# Patient Record
Sex: FEMALE | Race: BLACK OR AFRICAN AMERICAN | NOT HISPANIC OR LATINO | Employment: FULL TIME | URBAN - METROPOLITAN AREA
[De-identification: names, ages, dates, MRNs, and addresses within clinical notes are randomized per-mention and may not be internally consistent; named-entity substitution may affect disease eponyms.]

---

## 2020-03-12 LAB — CRC RECOMMENDATION EXT: NORMAL

## 2024-07-03 ENCOUNTER — PATIENT MESSAGE (OUTPATIENT)
Dept: ADMINISTRATIVE | Facility: OTHER | Age: 54
End: 2024-07-03
Payer: COMMERCIAL

## 2024-07-03 PROBLEM — E66.9 OBESITY: Status: ACTIVE | Noted: 2024-07-03

## 2024-07-03 PROBLEM — E78.49 OTHER HYPERLIPIDEMIA: Status: ACTIVE | Noted: 2024-07-03

## 2024-07-03 PROBLEM — I10 PRIMARY HYPERTENSION: Status: ACTIVE | Noted: 2024-07-03

## 2024-07-08 ENCOUNTER — PATIENT MESSAGE (OUTPATIENT)
Dept: ADMINISTRATIVE | Facility: OTHER | Age: 54
End: 2024-07-08
Payer: COMMERCIAL

## 2024-07-26 ENCOUNTER — PATIENT MESSAGE (OUTPATIENT)
Dept: ADMINISTRATIVE | Facility: OTHER | Age: 54
End: 2024-07-26
Payer: COMMERCIAL

## 2024-07-26 ENCOUNTER — PATIENT MESSAGE (OUTPATIENT)
Dept: OTHER | Facility: OTHER | Age: 54
End: 2024-07-26
Payer: COMMERCIAL

## 2024-08-01 ENCOUNTER — TELEPHONE (OUTPATIENT)
Dept: FAMILY MEDICINE | Facility: CLINIC | Age: 54
End: 2024-08-01
Payer: COMMERCIAL

## 2024-08-01 NOTE — TELEPHONE ENCOUNTER
Spoke with pt and confirmed new pt appt. Informed pt to bring all current medications to appt and if pt no shows we will be unable to reschedule them. Pt voiced understanding.

## 2024-08-06 ENCOUNTER — OFFICE VISIT (OUTPATIENT)
Dept: FAMILY MEDICINE | Facility: CLINIC | Age: 54
End: 2024-08-06
Payer: COMMERCIAL

## 2024-08-06 VITALS
HEART RATE: 84 BPM | DIASTOLIC BLOOD PRESSURE: 62 MMHG | BODY MASS INDEX: 41.28 KG/M2 | HEIGHT: 61 IN | OXYGEN SATURATION: 97 % | SYSTOLIC BLOOD PRESSURE: 128 MMHG | WEIGHT: 218.63 LBS

## 2024-08-06 DIAGNOSIS — Z12.31 OTHER SCREENING MAMMOGRAM: ICD-10-CM

## 2024-08-06 DIAGNOSIS — I10 PRIMARY HYPERTENSION: ICD-10-CM

## 2024-08-06 DIAGNOSIS — R25.2 LEG CRAMPING: ICD-10-CM

## 2024-08-06 DIAGNOSIS — E78.49 OTHER HYPERLIPIDEMIA: ICD-10-CM

## 2024-08-06 DIAGNOSIS — Z76.89 ENCOUNTER TO ESTABLISH CARE: Primary | ICD-10-CM

## 2024-08-06 DIAGNOSIS — G47.00 INSOMNIA, UNSPECIFIED TYPE: ICD-10-CM

## 2024-08-06 DIAGNOSIS — E66.01 CLASS 3 SEVERE OBESITY DUE TO EXCESS CALORIES WITH BODY MASS INDEX (BMI) OF 40.0 TO 44.9 IN ADULT, UNSPECIFIED WHETHER SERIOUS COMORBIDITY PRESENT: ICD-10-CM

## 2024-08-06 DIAGNOSIS — N95.1 VASOMOTOR SYMPTOMS DUE TO MENOPAUSE: ICD-10-CM

## 2024-08-06 DIAGNOSIS — B00.9 HSV INFECTION: ICD-10-CM

## 2024-08-06 DIAGNOSIS — R73.03 PREDIABETES: ICD-10-CM

## 2024-08-06 PROCEDURE — 99204 OFFICE O/P NEW MOD 45 MIN: CPT | Mod: ,,,

## 2024-08-06 PROCEDURE — 3008F BODY MASS INDEX DOCD: CPT | Mod: CPTII,,,

## 2024-08-06 PROCEDURE — 4010F ACE/ARB THERAPY RXD/TAKEN: CPT | Mod: CPTII,,,

## 2024-08-06 PROCEDURE — 3078F DIAST BP <80 MM HG: CPT | Mod: CPTII,,,

## 2024-08-06 PROCEDURE — 3074F SYST BP LT 130 MM HG: CPT | Mod: CPTII,,,

## 2024-08-06 PROCEDURE — 1159F MED LIST DOCD IN RCRD: CPT | Mod: CPTII,,,

## 2024-08-06 PROCEDURE — 1160F RVW MEDS BY RX/DR IN RCRD: CPT | Mod: CPTII,,,

## 2024-08-06 RX ORDER — VALACYCLOVIR HYDROCHLORIDE 500 MG/1
500 TABLET, FILM COATED ORAL 2 TIMES DAILY
COMMUNITY
End: 2024-08-06 | Stop reason: SDUPTHER

## 2024-08-06 RX ORDER — POTASSIUM CHLORIDE 750 MG/1
10 TABLET, EXTENDED RELEASE ORAL DAILY
Qty: 90 TABLET | Refills: 1 | Status: SHIPPED | OUTPATIENT
Start: 2024-08-06 | End: 2025-02-02

## 2024-08-06 RX ORDER — PHENTERMINE HYDROCHLORIDE 8 MG/1
1 TABLET ORAL 2 TIMES DAILY
Qty: 90 EACH | Refills: 1 | Status: CANCELLED | OUTPATIENT
Start: 2024-08-06

## 2024-08-06 RX ORDER — ATORVASTATIN CALCIUM 10 MG/1
10 TABLET, FILM COATED ORAL NIGHTLY
Qty: 90 TABLET | Refills: 1 | Status: SHIPPED | OUTPATIENT
Start: 2024-08-06

## 2024-08-06 RX ORDER — ZOLPIDEM TARTRATE 10 MG/1
10 TABLET ORAL NIGHTLY
Qty: 30 TABLET | Refills: 2 | Status: SHIPPED | OUTPATIENT
Start: 2024-08-06

## 2024-08-06 RX ORDER — POTASSIUM CHLORIDE 750 MG/1
10 TABLET, EXTENDED RELEASE ORAL ONCE
COMMUNITY
End: 2024-08-06 | Stop reason: SDUPTHER

## 2024-08-06 RX ORDER — CYCLOBENZAPRINE HCL 10 MG
10 TABLET ORAL 3 TIMES DAILY PRN
Qty: 60 TABLET | Refills: 1 | Status: SHIPPED | OUTPATIENT
Start: 2024-08-06

## 2024-08-06 RX ORDER — VALACYCLOVIR HYDROCHLORIDE 500 MG/1
500 TABLET, FILM COATED ORAL 2 TIMES DAILY
Qty: 30 TABLET | Refills: 2
Start: 2024-08-06

## 2024-08-06 RX ORDER — ESTRADIOL 0.5 MG/1
0.5 TABLET ORAL DAILY
Qty: 90 TABLET | Refills: 1 | Status: CANCELLED | OUTPATIENT
Start: 2024-08-06

## 2024-08-06 RX ORDER — HYDROCHLOROTHIAZIDE 12.5 MG/1
12.5 TABLET ORAL DAILY
Qty: 90 TABLET | Refills: 1 | Status: SHIPPED | OUTPATIENT
Start: 2024-08-06 | End: 2025-02-02

## 2024-08-06 RX ORDER — LOSARTAN POTASSIUM 25 MG/1
25 TABLET ORAL DAILY
Qty: 90 TABLET | Refills: 1 | Status: SHIPPED | OUTPATIENT
Start: 2024-08-06 | End: 2025-02-02

## 2024-08-07 ENCOUNTER — CLINICAL SUPPORT (OUTPATIENT)
Dept: OTHER | Facility: CLINIC | Age: 54
End: 2024-08-07

## 2024-08-07 DIAGNOSIS — Z00.8 ENCOUNTER FOR OTHER GENERAL EXAMINATION: ICD-10-CM

## 2024-08-08 VITALS
DIASTOLIC BLOOD PRESSURE: 72 MMHG | SYSTOLIC BLOOD PRESSURE: 116 MMHG | BODY MASS INDEX: 40.78 KG/M2 | HEIGHT: 61 IN | WEIGHT: 216 LBS

## 2024-08-08 LAB
GLUCOSE SERPL-MCNC: 83 MG/DL (ref 60–140)
HDLC SERPL-MCNC: 50 MG/DL
POC CHOLESTEROL, LDL (DOCK): 130 MG/DL
POC CHOLESTEROL, TOTAL: 189 MG/DL
TRIGL SERPL-MCNC: 46 MG/DL

## 2024-08-19 ENCOUNTER — TELEPHONE (OUTPATIENT)
Dept: FAMILY MEDICINE | Facility: CLINIC | Age: 54
End: 2024-08-19

## 2024-08-19 NOTE — PROGRESS NOTES
Biometrics completed.    Results reviewed with a Registered Nurse; understanding of results and   educational materials was verbalized.   Medical Records sent

## 2024-08-19 NOTE — TELEPHONE ENCOUNTER
Pt walked in about PA on Fezolinetant. Pt wanted to know update. Looks like Charity AKINS not sure why. Please call patient once there is an answer

## 2024-08-23 DIAGNOSIS — B00.9 HSV INFECTION: ICD-10-CM

## 2024-08-23 DIAGNOSIS — I10 PRIMARY HYPERTENSION: ICD-10-CM

## 2024-08-23 DIAGNOSIS — N95.1 VASOMOTOR SYMPTOMS DUE TO MENOPAUSE: ICD-10-CM

## 2024-08-23 DIAGNOSIS — E78.49 OTHER HYPERLIPIDEMIA: ICD-10-CM

## 2024-08-23 DIAGNOSIS — R25.2 LEG CRAMPING: ICD-10-CM

## 2024-08-23 DIAGNOSIS — G47.00 INSOMNIA, UNSPECIFIED TYPE: ICD-10-CM

## 2024-08-23 RX ORDER — LOSARTAN POTASSIUM 25 MG/1
25 TABLET ORAL DAILY
Qty: 90 TABLET | Refills: 1 | Status: SHIPPED | OUTPATIENT
Start: 2024-08-23 | End: 2025-02-19

## 2024-08-23 RX ORDER — CYCLOBENZAPRINE HCL 10 MG
10 TABLET ORAL 3 TIMES DAILY PRN
Qty: 60 TABLET | Refills: 1 | Status: SHIPPED | OUTPATIENT
Start: 2024-08-23

## 2024-08-23 RX ORDER — VALACYCLOVIR HYDROCHLORIDE 500 MG/1
500 TABLET, FILM COATED ORAL 2 TIMES DAILY
Qty: 180 TABLET | Refills: 1 | Status: SHIPPED | OUTPATIENT
Start: 2024-08-23 | End: 2025-02-19

## 2024-08-23 RX ORDER — POTASSIUM CHLORIDE 750 MG/1
10 TABLET, EXTENDED RELEASE ORAL DAILY
Qty: 90 TABLET | Refills: 1 | Status: SHIPPED | OUTPATIENT
Start: 2024-08-23 | End: 2025-02-19

## 2024-08-23 RX ORDER — ZOLPIDEM TARTRATE 10 MG/1
10 TABLET ORAL NIGHTLY
Qty: 30 TABLET | Refills: 2 | Status: SHIPPED | OUTPATIENT
Start: 2024-08-23

## 2024-08-23 RX ORDER — ATORVASTATIN CALCIUM 10 MG/1
10 TABLET, FILM COATED ORAL NIGHTLY
Qty: 90 TABLET | Refills: 1 | Status: SHIPPED | OUTPATIENT
Start: 2024-08-23

## 2024-08-23 RX ORDER — HYDROCHLOROTHIAZIDE 12.5 MG/1
12.5 TABLET ORAL DAILY
Qty: 90 TABLET | Refills: 1 | Status: SHIPPED | OUTPATIENT
Start: 2024-08-23 | End: 2025-02-19

## 2024-08-23 NOTE — TELEPHONE ENCOUNTER
----- Message from Adrian Cardenas sent at 8/23/2024 10:15 AM CDT -----  Pt came in and said change her pharmacy to [facility] pharmacy. She wants all of her medicines to go there.  [phone]

## 2024-09-23 ENCOUNTER — PATIENT MESSAGE (OUTPATIENT)
Dept: FAMILY MEDICINE | Facility: CLINIC | Age: 54
End: 2024-09-23
Payer: COMMERCIAL

## 2024-09-23 ENCOUNTER — TELEPHONE (OUTPATIENT)
Dept: FAMILY MEDICINE | Facility: CLINIC | Age: 54
End: 2024-09-23
Payer: COMMERCIAL

## 2024-09-23 NOTE — TELEPHONE ENCOUNTER
Spoke with pt advised we can not result hormone labs.   Pt is wanting us to review A1C  See portal message.

## 2024-09-23 NOTE — TELEPHONE ENCOUNTER
----- Message from Deisy Gomez sent at 9/23/2024 10:01 AM CDT -----  Pt dropped off gyn labs and pap. Would like merritt to review as she has not heard back from them yet   [phone]

## 2024-09-23 NOTE — TELEPHONE ENCOUNTER
Spoke with pt she is aware we are not resulting hormone labs.  Pt states she would like for glucose and A1c to be reviewed.   Pt states she has been eating mediterranean diet and losing weight.   Pt is on the weight list with weight loss clinic with insurance.     On your desk

## 2024-09-25 ENCOUNTER — TELEPHONE (OUTPATIENT)
Dept: FAMILY MEDICINE | Facility: CLINIC | Age: 54
End: 2024-09-25
Payer: COMMERCIAL

## 2024-09-25 NOTE — TELEPHONE ENCOUNTER
Pt states her B12, TSH, FSH were elevated etc. Advised pt she needs to wait  for OBGYN reccommendations since a hematologist wouldn't even be appropriate. She voiced understanding.

## 2024-09-25 NOTE — TELEPHONE ENCOUNTER
----- Message from Adrian Cardenas sent at 9/25/2024 11:38 AM CDT -----  Pt said she sent a message about her labs that her ob/gyn ordered and wants to know if she could get a referral to see a hematologist because she is concerned.  [phone]

## 2024-10-03 ENCOUNTER — HOSPITAL ENCOUNTER (OUTPATIENT)
Dept: RADIOLOGY | Facility: HOSPITAL | Age: 54
Discharge: HOME | End: 2024-10-03
Payer: COMMERCIAL

## 2024-10-03 VITALS — HEIGHT: 61 IN | WEIGHT: 216 LBS | BODY MASS INDEX: 40.78 KG/M2

## 2024-10-03 DIAGNOSIS — Z12.31 OTHER SCREENING MAMMOGRAM: ICD-10-CM

## 2024-10-03 PROCEDURE — 77067 SCR MAMMO BI INCL CAD: CPT | Mod: TC,PO

## 2024-10-03 PROCEDURE — 77063 BREAST TOMOSYNTHESIS BI: CPT | Mod: 26,,, | Performed by: RADIOLOGY

## 2024-10-03 PROCEDURE — 77067 SCR MAMMO BI INCL CAD: CPT | Mod: 26,,, | Performed by: RADIOLOGY

## 2024-10-04 ENCOUNTER — TELEPHONE (OUTPATIENT)
Dept: FAMILY MEDICINE | Facility: CLINIC | Age: 54
End: 2024-10-04
Payer: COMMERCIAL

## 2024-10-04 ENCOUNTER — TELEPHONE (OUTPATIENT)
Facility: CLINIC | Age: 54
End: 2024-10-04
Payer: COMMERCIAL

## 2024-10-04 DIAGNOSIS — R79.89 ELEVATED FERRITIN LEVEL: ICD-10-CM

## 2024-10-04 DIAGNOSIS — R79.89 ELEVATED VITAMIN B12 LEVEL: Primary | ICD-10-CM

## 2024-10-04 NOTE — TELEPHONE ENCOUNTER
Spoke with patient informed of mammo results per Rebeca GAN Patient verbalized understanding.     Remind me created.

## 2024-10-04 NOTE — NURSING
Oncology Navigation   Intake  Cancer Type: Benign hem  Type of Referral: Internal  Date of Referral: 10/04/24  Initial Nurse Navigator Contact: 10/04/24  Referral to Initial Contact Timeline (days): 0     Treatment                              Acuity      Follow Up  No follow-ups on file.

## 2024-10-04 NOTE — TELEPHONE ENCOUNTER
----- Message from DARWIN Bales sent at 10/4/2024  9:45 AM CDT -----  Normal mammogram. Repeat annually.

## 2024-10-17 NOTE — PROGRESS NOTES
Patient ID: Fredo Chacon is a 53 y.o. Black or  female    Subjective  Chief Complaint: patient presents for medical weight loss management.    Contraindications to GLP-1 receptor agonist therapy:   Denies personal or family history of MTC and personal history of MEN2     Co-morbidities: HTN, DLD, prediabetes    History of weight loss therapy:  Pt has previously tried phentermine but denies use of GLP-1 medications.     Weight loss history:  Starting weight:    10/15/2024   Recent Readings    Weight (lbs) 213.8 lb    BMI 39.1 BMI      Objective  No results found.    Assessment/Plan  -Pt qualifies for GLP-1 RA therapy based on BMI greater than or equal to 30 kg/m2  -Initiate Wegovy 0.25 mg once weekly for 1 month  -Then increase to Wegovy 0.5 mg once weekly for 1 month  -Then increase to Wegovy 1 mg once weekly  -RTC in 3 months    Patient consented to pharmacist management via collaborative practice.

## 2024-10-18 ENCOUNTER — OFFICE VISIT (OUTPATIENT)
Dept: INTERNAL MEDICINE | Facility: CLINIC | Age: 54
End: 2024-10-18
Payer: COMMERCIAL

## 2024-10-18 ENCOUNTER — PATIENT MESSAGE (OUTPATIENT)
Dept: INTERNAL MEDICINE | Facility: CLINIC | Age: 54
End: 2024-10-18

## 2024-10-18 DIAGNOSIS — E66.9 OBESITY, UNSPECIFIED CLASS, UNSPECIFIED OBESITY TYPE, UNSPECIFIED WHETHER SERIOUS COMORBIDITY PRESENT: Primary | ICD-10-CM

## 2024-10-18 PROCEDURE — 99499 UNLISTED E&M SERVICE: CPT | Mod: 95,,,

## 2024-10-19 RX ORDER — SEMAGLUTIDE 1 MG/.5ML
1 INJECTION, SOLUTION SUBCUTANEOUS
Qty: 2 ML | Refills: 0 | Status: ACTIVE | OUTPATIENT
Start: 2024-12-14

## 2024-10-19 RX ORDER — SEMAGLUTIDE 0.25 MG/.5ML
0.25 INJECTION, SOLUTION SUBCUTANEOUS
Qty: 2 ML | Refills: 0 | Status: ACTIVE | OUTPATIENT
Start: 2024-10-19

## 2024-10-19 RX ORDER — SEMAGLUTIDE 0.5 MG/.5ML
0.5 INJECTION, SOLUTION SUBCUTANEOUS
Qty: 2 ML | Refills: 0 | Status: ACTIVE | OUTPATIENT
Start: 2024-11-16

## 2024-10-24 PROBLEM — R79.89 ELEVATED FERRITIN: Status: ACTIVE | Noted: 2024-10-24

## 2024-10-24 PROBLEM — R79.89 ELEVATED VITAMIN B12 LEVEL: Status: ACTIVE | Noted: 2024-10-24

## 2024-10-24 NOTE — PROGRESS NOTES
[facility]  History & Physical    Subjective:      Patient ID:   NAME: Fredo Chacon : 1970     53 y.o. female    Referring Doc: Deisy Luong APRN*  Other Physicians: Beena Howard NP (GYN)        Chief Complaint: elevated ferritin/B12      HPI:  53 y.o. female with diagnosis of elevated ferritin/B12 levels who has been referred by Deisy Luong APRN* for evaluation by medical hematology/oncology. She is here by herself    She saw GYN about her menopause issues and had some basic labs done.     She has a lot of fatigue; breathing is fine.    She had HTN and is prediabetic. She is on metformin and has been on this for about a month. She is also on thyroid meds.    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     Maternal-Grandmother as RA and mom has COPD and OA    Paternal Aunt with leukemia    No tobacco or alcohol              ROS:   GEN: normal without any fever, night sweats or weight loss; fatigue  HEENT: normal with no HA's, sore throat, stiff neck, changes in vision  CV: normal with no CP, SOB, PND, STEVEN or orthopnea  PULM: normal with no SOB, cough, hemoptysis, sputum or pleuritic pain  GI: normal with no abdominal pain, nausea, vomiting, constipation, diarrhea, melanotic stools, BRBPR, or hematemesis  : normal with no hematuria, dysuria  BREAST: normal with no mass, discharge, pain  SKIN: normal with no rash, erythema, bruising, or swelling       Past Medical/Surgical History:  Past Medical History:   Diagnosis Date    Elevated ferritin 10/24/2024    Elevated vitamin B12 level 10/24/2024    Obesity     Other hyperlipidemia 2024     Past Surgical History:   Procedure Laterality Date    CHOLECYSTECTOMY      HYSTERECTOMY      TUBAL LIGATION           Allergies:  Review of patient's allergies indicates:  No Known Allergies    Social/Family History:  Social History     Socioeconomic History    Marital status: Single   Tobacco Use     Smoking status: Never    Smokeless tobacco: Never   Substance and Sexual Activity    Alcohol use: Never    Drug use: Never     Social Drivers of Health     Financial Resource Strain: Low Risk  (8/5/2024)    Overall Financial Resource Strain (CARDIA)     Difficulty of Paying Living Expenses: Not hard at all   Food Insecurity: No Food Insecurity (8/5/2024)    Hunger Vital Sign     Worried About Running Out of Food in the Last Year: Never true     Ran Out of Food in the Last Year: Never true   Physical Activity: Insufficiently Active (8/5/2024)    Exercise Vital Sign     Days of Exercise per Week: 2 days     Minutes of Exercise per Session: 30 min   Stress: Stress Concern Present (8/5/2024)    [employer] - Occupational Stress Questionnaire     Feeling of Stress : Rather much   Housing Stability: Unknown (8/5/2024)    Housing Stability Vital Sign     Unable to Pay for Housing in the Last Year: No     Family History   Problem Relation Name Age of Onset    Hypertension Mother      Hyperlipidemia Mother      Hypertension Brother      Hypertension Maternal Grandmother           Medications:    Current Outpatient Medications:     atorvastatin (LIPITOR) 10 MG tablet, Take 1 tablet (10 mg total) by mouth every evening., Disp: 90 tablet, Rfl: 1    cyclobenzaprine (FLEXERIL) 10 MG tablet, Take 1 tablet (10 mg total) by mouth 3 (three) times daily as needed for Muscle spasms., Disp: 60 tablet, Rfl: 1    estradioL (ESTRACE) 1 MG tablet, Take 1 oral tablet once per day, Disp: 30 tablet, Rfl: 3    hydroCHLOROthiazide (HYDRODIURIL) 12.5 MG Tab, Take 1 tablet (12.5 mg total) by mouth once daily., Disp: 90 tablet, Rfl: 1    losartan (COZAAR) 25 MG tablet, Take 1 tablet (25 mg total) by mouth once daily., Disp: 90 tablet, Rfl: 1    metFORMIN (GLUCOPHAGE) 500 MG tablet, Take 1 oral tablet twice per day, Disp: 60 tablet, Rfl: 3    metFORMIN (GLUCOPHAGE-XR) 500 MG ER 24hr tablet, Take 2 tablets (1,000 mg  total) by mouth 2 (two) times a day., Disp: 60 tablet, Rfl: 0    NP THYROID 30 mg Tab, Take 1 oral tablet once per day on empty stomach. Wait 30 minutes before consuming any food or beverage, Disp: 1 tablet, Rfl: 0    NP THYROID 30 mg Tab, Take 1 oral tablet once per day on empty stomach. Wait 30 minutes before consuming any food or beverage, Disp: 30 tablet, Rfl: 0    potassium chloride (KLOR-CON) 10 MEQ TbSR, Take 1 tablet (10 mEq total) by mouth once daily. for 180 doses, Disp: 90 tablet, Rfl: 1    semaglutide, weight loss, (WEGOVY) 0.25 mg/0.5 mL PnIj, Inject 0.25 mg into the skin every 7 days., Disp: 2 mL, Rfl: 0    [START ON 11/16/2024] semaglutide, weight loss, (WEGOVY) 0.5 mg/0.5 mL PnIj, Inject 0.5 mg into the skin every 7 days., Disp: 2 mL, Rfl: 0    [START ON 12/14/2024] semaglutide, weight loss, (WEGOVY) 1 mg/0.5 mL PnIj, Inject 1 mg into the skin every 7 days., Disp: 2 mL, Rfl: 0    valACYclovir (VALTREX) 500 MG tablet, Take 1 tablet (500 mg total) by mouth 2 (two) times daily., Disp: 180 tablet, Rfl: 1    zolpidem (AMBIEN) 10 mg Tab, Take 1 tablet (10 mg total) by mouth every evening., Disp: 30 tablet, Rfl: 2    fezolinetant 45 mg Tab, Take 1 TABLET (45 mg) by mouth every evening., Disp: 30 tablet, Rfl: 11      Pathology:   Cancer Staging   No matching staging information was found for the patient.        Objective:   Vitals:  Blood pressure 120/84, pulse 71, temperature 97.4 °F (36.3 °C), resp. rate 16, weight 98 kg (216 lb).    Physical Examination:   GEN: no apparent distress, comfortable; AAOx3; overweight  HEAD: atraumatic and normocephalic  EYES: no pallor, no icterus, PERRLA  ENT: OMM, no pharyngeal erythema, external ears WNL; no nasal discharge; no thrush  NECK: no masses, thyroid normal, trachea midline, no LAD/LN's, supple  CV: RRR with no murmur; normal pulse; normal S1 and S2; no pedal edema  CHEST: Normal respiratory effort; CTAB; normal breath sounds; no wheeze or crackles  ABDOM:  nontender and nondistended; soft; normal bowel sounds; no rebound/guarding  MUSC/Skeletal: ROM normal; no crepitus; joints normal; no deformities or arthropathy  EXTREM: no clubbing, cyanosis, inflammation or swelling  SKIN: no rashes, lesions, ulcers, petechiae or subcutaneous nodules  : no orr  NEURO: grossly intact; motor/sensory WNL; AAOx3; no tremors  PSYCH: normal mood, affect and behavior  LYMPH: normal cervical, supraclavicular, axillary and groin LN's      Labs:     9/16/2024 on chart from [facility]          Radiology/Diagnostic Studies:          All lab results and imaging results have been reviewed and discussed with the patient    Assessment:   (1) 53 y.o. female with diagnosis of elevated ferritin/B12 levels who has been referred by Deisy Luong APRN* for evaluation by medical hematology/oncology.     - ferritin was 201 and only mildly elevated - could be an acute phase reactant due to inflammation, arthritis, etc; will check complete iron panel and order hemochromatosis gene study out of abundance of precaution  - B12 level was 1353 and only mildly elevated - B12 is a water soluble vitamin that is obtained from the foods we eat; excess is urinated out  - CBC is adequate and WNL  - she had colonoscopy about 5 yrs ago and was good except for internal hemorrhoids     (2) HTN and hypercholesterolemia    (3) Prediabetic    (4) Obesity - on phentermine     (5) Hx/of HSV infection    (6) Insomnia     (7) Hx/of hysterectomy (2009)    (8) Hx/of cholecystectomy ('85)          VISIT DIAGNOSES:     Elevated ferritin    Elevated vitamin B12 level  -     Ambulatory referral/consult to Hematology / Oncology    Elevated ferritin level  -     Ambulatory referral/consult to Hematology / Oncology          Plan:     PLAN:  Check full iron panel and CBC and monitor every 3 months for now  Order hemochromatosis study out of abundance of precaution  F/u with GYN and PCP  Recommend she f/u with GI as directed for  routine colonoscopy surveillance etc     RTC in  4 weeks   Fax note to Deisy Luong APRN*; Beena Howard NP (GYN)        I have explained and the patient understands all of  the current recommendation(s). I have answered all of their questions to the best of my ability and to their complete satisfaction.           Thank you for allowing me to participate in this patient's care. Please call with any questions or concerns.    Electronically signed Eliazar Wood MD    Answers submitted by the patient for this visit:  Review of Systems Questionnaire (Submitted on 10/19/2024)  appetite change : No  unexpected weight change: No  mouth sores: No  visual disturbance: No  cough: No  shortness of breath: No  chest pain: No  abdominal pain: No  diarrhea: No  frequency: No  back pain: No  rash: No  headaches: No  adenopathy: No  nervous/ anxious: No

## 2024-10-25 ENCOUNTER — OFFICE VISIT (OUTPATIENT)
Facility: CLINIC | Age: 54
End: 2024-10-25
Payer: COMMERCIAL

## 2024-10-25 VITALS
WEIGHT: 216 LBS | BODY MASS INDEX: 40.81 KG/M2 | TEMPERATURE: 97 F | SYSTOLIC BLOOD PRESSURE: 120 MMHG | HEART RATE: 71 BPM | RESPIRATION RATE: 16 BRPM | DIASTOLIC BLOOD PRESSURE: 84 MMHG

## 2024-10-25 DIAGNOSIS — R79.89 ELEVATED FERRITIN: Primary | ICD-10-CM

## 2024-10-25 DIAGNOSIS — R79.89 ELEVATED VITAMIN B12 LEVEL: ICD-10-CM

## 2024-10-25 DIAGNOSIS — R79.89 ELEVATED FERRITIN LEVEL: ICD-10-CM

## 2024-10-25 PROCEDURE — 99999 PR PBB SHADOW E&M-EST. PATIENT-LVL IV: CPT | Mod: PBBFAC,,, | Performed by: INTERNAL MEDICINE

## 2024-10-28 ENCOUNTER — LAB VISIT (OUTPATIENT)
Dept: LAB | Facility: HOSPITAL | Age: 54
End: 2024-10-28
Attending: INTERNAL MEDICINE
Payer: COMMERCIAL

## 2024-10-28 DIAGNOSIS — R79.89 ELEVATED FERRITIN LEVEL: ICD-10-CM

## 2024-10-28 DIAGNOSIS — R79.89 ELEVATED VITAMIN B12 LEVEL: ICD-10-CM

## 2024-10-28 DIAGNOSIS — R79.89 ELEVATED FERRITIN: ICD-10-CM

## 2024-10-28 LAB
25(OH)D3+25(OH)D2 SERPL-MCNC: 58 NG/ML (ref 30–96)
ALBUMIN SERPL BCP-MCNC: 4.3 G/DL (ref 3.5–5.2)
ALP SERPL-CCNC: 62 U/L (ref 55–135)
ALT SERPL W/O P-5'-P-CCNC: 13 U/L (ref 10–44)
ANION GAP SERPL CALC-SCNC: 7 MMOL/L (ref 8–16)
AST SERPL-CCNC: 15 U/L (ref 10–40)
BASOPHILS # BLD AUTO: 0.05 K/UL (ref 0–0.2)
BASOPHILS NFR BLD: 0.7 % (ref 0–1.9)
BILIRUB SERPL-MCNC: 0.4 MG/DL (ref 0.1–1)
BUN SERPL-MCNC: 17 MG/DL (ref 6–20)
CALCIUM SERPL-MCNC: 10.4 MG/DL (ref 8.7–10.5)
CHLORIDE SERPL-SCNC: 100 MMOL/L (ref 95–110)
CO2 SERPL-SCNC: 30 MMOL/L (ref 23–29)
CREAT SERPL-MCNC: 0.6 MG/DL (ref 0.5–1.4)
DIFFERENTIAL METHOD BLD: ABNORMAL
EOSINOPHIL # BLD AUTO: 0.1 K/UL (ref 0–0.5)
EOSINOPHIL NFR BLD: 1.6 % (ref 0–8)
ERYTHROCYTE [DISTWIDTH] IN BLOOD BY AUTOMATED COUNT: 13 % (ref 11.5–14.5)
EST. GFR  (NO RACE VARIABLE): >60 ML/MIN/1.73 M^2
FERRITIN SERPL-MCNC: 79.8 NG/ML (ref 20–300)
FOLATE SERPL-MCNC: 15 NG/ML (ref 4–24)
GLUCOSE SERPL-MCNC: 114 MG/DL (ref 70–110)
HCT VFR BLD AUTO: 42 % (ref 37–48.5)
HGB BLD-MCNC: 13.4 G/DL (ref 12–16)
IMM GRANULOCYTES # BLD AUTO: 0.01 K/UL (ref 0–0.04)
IMM GRANULOCYTES NFR BLD AUTO: 0.1 % (ref 0–0.5)
IRON SERPL-MCNC: 56 UG/DL (ref 30–160)
LYMPHOCYTES # BLD AUTO: 2.3 K/UL (ref 1–4.8)
LYMPHOCYTES NFR BLD: 34.4 % (ref 18–48)
MCH RBC QN AUTO: 29.1 PG (ref 27–31)
MCHC RBC AUTO-ENTMCNC: 31.9 G/DL (ref 32–36)
MCV RBC AUTO: 91 FL (ref 82–98)
MONOCYTES # BLD AUTO: 0.5 K/UL (ref 0.3–1)
MONOCYTES NFR BLD: 6.9 % (ref 4–15)
NEUTROPHILS # BLD AUTO: 3.8 K/UL (ref 1.8–7.7)
NEUTROPHILS NFR BLD: 56.3 % (ref 38–73)
NRBC BLD-RTO: 0 /100 WBC
PLATELET # BLD AUTO: 307 K/UL (ref 150–450)
PMV BLD AUTO: 8.8 FL (ref 9.2–12.9)
POTASSIUM SERPL-SCNC: 4.4 MMOL/L (ref 3.5–5.1)
PROT SERPL-MCNC: 7.3 G/DL (ref 6–8.4)
RBC # BLD AUTO: 4.61 M/UL (ref 4–5.4)
SATURATED IRON: 17 % (ref 20–50)
SODIUM SERPL-SCNC: 137 MMOL/L (ref 136–145)
TOTAL IRON BINDING CAPACITY: 339 UG/DL (ref 250–450)
TRANSFERRIN SERPL-MCNC: 242 MG/DL (ref 200–375)
VIT B12 SERPL-MCNC: 640 PG/ML (ref 210–950)
WBC # BLD AUTO: 6.71 K/UL (ref 3.9–12.7)

## 2024-10-28 PROCEDURE — 83921 ORGANIC ACID SINGLE QUANT: CPT | Performed by: INTERNAL MEDICINE

## 2024-10-28 PROCEDURE — 82306 VITAMIN D 25 HYDROXY: CPT | Performed by: INTERNAL MEDICINE

## 2024-10-28 PROCEDURE — 82728 ASSAY OF FERRITIN: CPT | Performed by: INTERNAL MEDICINE

## 2024-10-28 PROCEDURE — 36415 COLL VENOUS BLD VENIPUNCTURE: CPT | Performed by: INTERNAL MEDICINE

## 2024-10-28 PROCEDURE — 83540 ASSAY OF IRON: CPT | Performed by: INTERNAL MEDICINE

## 2024-10-28 PROCEDURE — 82607 VITAMIN B-12: CPT | Performed by: INTERNAL MEDICINE

## 2024-10-28 PROCEDURE — 85025 COMPLETE CBC W/AUTO DIFF WBC: CPT | Performed by: INTERNAL MEDICINE

## 2024-10-28 PROCEDURE — 82746 ASSAY OF FOLIC ACID SERUM: CPT | Performed by: INTERNAL MEDICINE

## 2024-10-28 PROCEDURE — 81256 HFE GENE: CPT | Performed by: INTERNAL MEDICINE

## 2024-10-28 PROCEDURE — 80053 COMPREHEN METABOLIC PANEL: CPT | Performed by: INTERNAL MEDICINE

## 2024-10-29 ENCOUNTER — TELEPHONE (OUTPATIENT)
Facility: CLINIC | Age: 54
End: 2024-10-29
Payer: COMMERCIAL

## 2024-11-04 LAB — METHLYMALONIC ACID: 140 NMOL/L (ref 0–378)

## 2024-11-06 LAB
LABCORP MISC TEST CODE: NORMAL
LABCORP MISC TEST NAME: NORMAL
LABCORP MISCELLANEOUS TEST: NORMAL

## 2024-11-17 ENCOUNTER — PATIENT MESSAGE (OUTPATIENT)
Dept: ADMINISTRATIVE | Facility: OTHER | Age: 54
End: 2024-11-17
Payer: COMMERCIAL

## 2024-11-20 DIAGNOSIS — E66.9 OBESITY, UNSPECIFIED CLASS, UNSPECIFIED OBESITY TYPE, UNSPECIFIED WHETHER SERIOUS COMORBIDITY PRESENT: ICD-10-CM

## 2024-11-20 RX ORDER — SEMAGLUTIDE 0.25 MG/.5ML
0.25 INJECTION, SOLUTION SUBCUTANEOUS
Qty: 2 ML | Refills: 0 | Status: CANCELLED | OUTPATIENT
Start: 2024-11-20

## 2024-11-24 DIAGNOSIS — G47.00 INSOMNIA, UNSPECIFIED TYPE: ICD-10-CM

## 2024-11-25 RX ORDER — ZOLPIDEM TARTRATE 10 MG/1
10 TABLET ORAL NIGHTLY
Qty: 30 TABLET | Refills: 2 | Status: SHIPPED | OUTPATIENT
Start: 2024-11-25

## 2024-12-11 ENCOUNTER — PATIENT MESSAGE (OUTPATIENT)
Dept: ADMINISTRATIVE | Facility: OTHER | Age: 54
End: 2024-12-11
Payer: COMMERCIAL

## 2024-12-30 ENCOUNTER — TELEPHONE (OUTPATIENT)
Dept: FAMILY MEDICINE | Facility: CLINIC | Age: 54
End: 2024-12-30
Payer: COMMERCIAL

## 2024-12-30 DIAGNOSIS — R11.0 NAUSEA: Primary | ICD-10-CM

## 2024-12-30 RX ORDER — PROMETHAZINE HYDROCHLORIDE 12.5 MG/1
12.5 TABLET ORAL EVERY 12 HOURS PRN
Qty: 10 TABLET | Refills: 0 | Status: SHIPPED | OUTPATIENT
Start: 2024-12-30

## 2024-12-30 NOTE — TELEPHONE ENCOUNTER
----- Message from [place] sent at 12/30/2024 11:54 AM CST -----  Increased her dose of wegovy making her Sick.  Can she get something called in for Nausea [phone]

## 2024-12-30 NOTE — TELEPHONE ENCOUNTER
----- Message from Adrian sent at 12/30/2024  2:10 PM CST -----  Vm-1:58pm-Pt was returning Meenakshi's call.  [phone]

## 2025-01-08 DIAGNOSIS — E66.9 OBESITY, UNSPECIFIED CLASS, UNSPECIFIED OBESITY TYPE, UNSPECIFIED WHETHER SERIOUS COMORBIDITY PRESENT: ICD-10-CM

## 2025-01-08 RX ORDER — SEMAGLUTIDE 1 MG/.5ML
1 INJECTION, SOLUTION SUBCUTANEOUS
Qty: 2 ML | Refills: 0 | Status: ACTIVE | OUTPATIENT
Start: 2025-01-08

## 2025-01-13 ENCOUNTER — OFFICE VISIT (OUTPATIENT)
Dept: URGENT CARE | Facility: CLINIC | Age: 55
End: 2025-01-13
Payer: COMMERCIAL

## 2025-01-13 VITALS
HEART RATE: 82 BPM | RESPIRATION RATE: 19 BRPM | BODY MASS INDEX: 35.51 KG/M2 | TEMPERATURE: 98 F | DIASTOLIC BLOOD PRESSURE: 86 MMHG | OXYGEN SATURATION: 100 % | WEIGHT: 193 LBS | HEIGHT: 62 IN | SYSTOLIC BLOOD PRESSURE: 135 MMHG

## 2025-01-13 DIAGNOSIS — J01.00 ACUTE NON-RECURRENT MAXILLARY SINUSITIS: Primary | ICD-10-CM

## 2025-01-13 DIAGNOSIS — M26.641 ARTHRITIS OF RIGHT TEMPOROMANDIBULAR JOINT: ICD-10-CM

## 2025-01-13 PROCEDURE — 99214 OFFICE O/P EST MOD 30 MIN: CPT | Mod: S$GLB,,,

## 2025-01-13 RX ORDER — MICONAZOLE NITRATE 200 MG/1
200 SUPPOSITORY VAGINAL NIGHTLY
Qty: 3 SUPPOSITORY | Refills: 0 | Status: SHIPPED | OUTPATIENT
Start: 2025-01-13 | End: 2025-01-14

## 2025-01-13 RX ORDER — POTASSIUM CITRATE AND CITRIC ACID MONOHYDRATE 1100; 334 MG/5ML; MG/5ML
5 SOLUTION ORAL
COMMUNITY

## 2025-01-13 RX ORDER — AMOXICILLIN AND CLAVULANATE POTASSIUM 875; 125 MG/1; MG/1
1 TABLET, FILM COATED ORAL EVERY 12 HOURS
Qty: 14 TABLET | Refills: 0 | Status: SHIPPED | OUTPATIENT
Start: 2025-01-13 | End: 2025-01-14

## 2025-01-13 NOTE — PROGRESS NOTES
"Subjective:      Patient ID: Fredo Chacon is a 54 y.o. female.    Vitals:  height is 5' 2" (1.575 m) and weight is 87.5 kg (193 lb). Her oral temperature is 98 °F (36.7 °C). Her blood pressure is 135/86 and her pulse is 82. Her respiration is 19 and oxygen saturation is 100%.     Chief Complaint: Sinus Problem    Right-sided facial pains radiating to right ear, chills, congestion, rhinorrhea with green mucus, and productive cough for 3 weeks.  He has tried numerous OTC meds with little improvement.    Sinus Problem  This is a new problem. Episode onset: 2 to 3 weeks. The problem has been gradually worsening since onset. There has been no fever. Her pain is at a severity of 0/10. She is experiencing no pain. Associated symptoms include chills, congestion, coughing, ear pain, headaches and sinus pressure. Past treatments include oral decongestants and nasal decongestants. The treatment provided no relief.       Constitution: Positive for chills.   HENT:  Positive for ear pain, congestion and sinus pressure.    Neck: neck negative.   Cardiovascular: Negative.    Eyes: Negative.    Respiratory:  Positive for cough and sputum production.    Gastrointestinal: Negative.    Endocrine: negative.   Genitourinary: Negative.    Musculoskeletal: Negative.    Skin: Negative.    Allergic/Immunologic: Negative for recurrent sinus infections.   Neurological:  Positive for headaches.   Hematologic/Lymphatic: Negative.    Psychiatric/Behavioral: Negative.        Objective:     Physical Exam   Constitutional: She is oriented to person, place, and time. She is cooperative.  Non-toxic appearance. She does not appear ill. No distress.   HENT:   Head: Normocephalic and atraumatic.   Ears:   Right Ear: Tympanic membrane, external ear and ear canal normal.   Left Ear: Tympanic membrane, external ear and ear canal normal.   Nose: Right sinus exhibits maxillary sinus tenderness and frontal sinus tenderness.   Mouth/Throat: Uvula is " midline, oropharynx is clear and moist and mucous membranes are normal. Mucous membranes are moist. Oropharynx is clear.   Clicking sensation in right TMJ joint.  Pain withfull extension of jaw.      Comments: Clicking sensation in right TMJ joint.  Pain withfull extension of jaw.  Eyes: Conjunctivae and lids are normal. Pupils are equal, round, and reactive to light. Extraocular movement intact   Neck: Trachea normal and phonation normal. Neck supple.   Cardiovascular: Normal rate, regular rhythm, normal heart sounds and normal pulses.   Pulmonary/Chest: Effort normal and breath sounds normal. She has no wheezes. She has no rhonchi. She has no rales.   Abdominal: Normal appearance.   Musculoskeletal: Normal range of motion.         General: Normal range of motion.   Lymphadenopathy:     She has no cervical adenopathy.   Neurological: no focal deficit. She is alert, oriented to person, place, and time and at baseline. She has normal motor skills, normal sensation and intact cranial nerves (2-12). Gait and coordination normal. GCS eye subscore is 4. GCS verbal subscore is 5. GCS motor subscore is 6.   Skin: Skin is warm, dry and not diaphoretic. Capillary refill takes 2 to 3 seconds.   Psychiatric: She experiences Normal attention and Normal perception. Her speech is normal and behavior is normal. Mood, judgment and thought content normal.   Nursing note and vitals reviewed.      Assessment:     1. Acute non-recurrent maxillary sinusitis    2. Arthritis of right temporomandibular joint        Plan:       Acute non-recurrent maxillary sinusitis  -     amoxicillin-clavulanate 875-125mg (AUGMENTIN) 875-125 mg per tablet; Take 1 tablet by mouth every 12 (twelve) hours. for 7 days  Dispense: 14 tablet; Refill: 0    Arthritis of right temporomandibular joint    Other orders  -     miconazole nitrate (MICONAZOLE-3) 200 mg Supp; Place 1 suppository (200 mg total) vaginally every evening. for 3 days  Dispense: 3 suppository;  Refill: 0    Right-sided maxillary tenderness symptoms been constant for 3 weeks.  Also exhibiting signs of TMJ arthritis such as clicking sensation with chewing, and tenderness when palpating over TMJ.  This is likely was causing her otalgia.  Patient is currently on Flexeril, and has been encouraged to continue taking as well as supplementing with ice, and ibuprofen.

## 2025-01-14 RX ORDER — AMOXICILLIN AND CLAVULANATE POTASSIUM 875; 125 MG/1; MG/1
1 TABLET, FILM COATED ORAL EVERY 12 HOURS
Qty: 14 TABLET | Refills: 0 | Status: SHIPPED | OUTPATIENT
Start: 2025-01-14 | End: 2025-01-22

## 2025-01-14 RX ORDER — MICONAZOLE NITRATE 200 MG/1
200 SUPPOSITORY VAGINAL NIGHTLY
Qty: 3 SUPPOSITORY | Refills: 0 | Status: SHIPPED | OUTPATIENT
Start: 2025-01-14 | End: 2025-01-18

## 2025-01-17 NOTE — PROGRESS NOTES
Patient ID: Fredo Chacon is a 54 y.o. Black or  female    Subjective  Chief Complaint: patient presents for medical weight loss management.    Co-morbidities: HTN, DLD, prediabetes    HPI: Patient [facility] with [facility] in October 2024 and is currently managed on Wegovy 1 mg.     Tolerance to current therapy:  Denies nausea, vomiting, diarrhea, constipation, abdominal pain    Weight loss history:  Starting weight:    10/15/2024   Recent Readings    Weight (lbs) 213.8 lb    BMI 39.1 BMI    Current weight:    1/15/2025   Recent Readings    Weight (lbs) 193 lb        BMI 35.3 BMI        % weight loss since GLP-1 initiation: 9.7 %    Objective  Lab Results   Component Value Date     10/28/2024     Lab Results   Component Value Date    K 4.4 10/28/2024     Lab Results   Component Value Date     10/28/2024     Lab Results   Component Value Date    CO2 30 (H) 10/28/2024     Lab Results   Component Value Date    BUN 17 10/28/2024     Lab Results   Component Value Date     (H) 10/28/2024     Lab Results   Component Value Date    CALCIUM 10.4 10/28/2024     Lab Results   Component Value Date    PROT 7.3 10/28/2024     Lab Results   Component Value Date    ALBUMIN 4.3 10/28/2024     Lab Results   Component Value Date    BILITOT 0.4 10/28/2024     Lab Results   Component Value Date    AST 15 10/28/2024     Lab Results   Component Value Date    ALT 13 10/28/2024     Lab Results   Component Value Date    ANIONGAP 7 (L) 10/28/2024     Lab Results   Component Value Date    CREATININE 0.6 10/28/2024     Lab Results   Component Value Date    EGFRNORACEVR >60.0 10/28/2024     Assessment/Plan  - Increase to Wegovy 1.7 mg x 4 weeks   - Then increase to Wegovy 2.4 mg SQ weekly  - RTC in 3 months for follow-up evaluation    Patient consented to pharmacist management via collaborative practice.

## 2025-01-21 ENCOUNTER — OFFICE VISIT (OUTPATIENT)
Dept: INTERNAL MEDICINE | Facility: CLINIC | Age: 55
End: 2025-01-21
Payer: COMMERCIAL

## 2025-01-21 ENCOUNTER — PATIENT MESSAGE (OUTPATIENT)
Dept: INTERNAL MEDICINE | Facility: CLINIC | Age: 55
End: 2025-01-21

## 2025-01-21 DIAGNOSIS — E66.812 OBESITY, CLASS II, BMI 35-39.9: Primary | ICD-10-CM

## 2025-01-21 PROCEDURE — 99499 UNLISTED E&M SERVICE: CPT | Mod: 95,,,

## 2025-01-21 RX ORDER — SEMAGLUTIDE 1.7 MG/.75ML
1.7 INJECTION, SOLUTION SUBCUTANEOUS
Qty: 3 ML | Refills: 0 | Status: ACTIVE | OUTPATIENT
Start: 2025-01-21

## 2025-01-21 RX ORDER — SEMAGLUTIDE 2.4 MG/.75ML
2.4 INJECTION, SOLUTION SUBCUTANEOUS
Qty: 3 ML | Refills: 2 | Status: ACTIVE | OUTPATIENT
Start: 2025-01-21

## 2025-02-06 ENCOUNTER — OFFICE VISIT (OUTPATIENT)
Dept: FAMILY MEDICINE | Facility: CLINIC | Age: 55
End: 2025-02-06
Payer: COMMERCIAL

## 2025-02-06 VITALS
WEIGHT: 188.31 LBS | BODY MASS INDEX: 34.65 KG/M2 | HEART RATE: 80 BPM | SYSTOLIC BLOOD PRESSURE: 102 MMHG | DIASTOLIC BLOOD PRESSURE: 64 MMHG | OXYGEN SATURATION: 97 % | HEIGHT: 62 IN

## 2025-02-06 DIAGNOSIS — Z13.220 ENCOUNTER FOR LIPID SCREENING FOR CARDIOVASCULAR DISEASE: ICD-10-CM

## 2025-02-06 DIAGNOSIS — E07.9 THYROID DYSFUNCTION: ICD-10-CM

## 2025-02-06 DIAGNOSIS — I10 PRIMARY HYPERTENSION: Primary | ICD-10-CM

## 2025-02-06 DIAGNOSIS — E78.49 OTHER HYPERLIPIDEMIA: ICD-10-CM

## 2025-02-06 DIAGNOSIS — Z13.6 ENCOUNTER FOR LIPID SCREENING FOR CARDIOVASCULAR DISEASE: ICD-10-CM

## 2025-02-06 DIAGNOSIS — Z00.00 WELLNESS EXAMINATION: ICD-10-CM

## 2025-02-06 DIAGNOSIS — R73.03 PREDIABETES: ICD-10-CM

## 2025-02-06 DIAGNOSIS — R11.0 NAUSEA: ICD-10-CM

## 2025-02-06 RX ORDER — PROMETHAZINE HYDROCHLORIDE 12.5 MG/1
12.5 TABLET ORAL EVERY 12 HOURS PRN
Qty: 20 TABLET | Refills: 1 | Status: SHIPPED | OUTPATIENT
Start: 2025-02-06

## 2025-02-06 RX ORDER — ATORVASTATIN CALCIUM 10 MG/1
10 TABLET, FILM COATED ORAL NIGHTLY
Qty: 90 TABLET | Refills: 3 | Status: SHIPPED | OUTPATIENT
Start: 2025-02-06 | End: 2026-02-06

## 2025-02-06 NOTE — PROGRESS NOTES
SUBJECTIVE:    Patient ID: Fredo Chacon is a 54 y.o. female.    Chief Complaint: Follow-up (No bottles//Pt here for 6 mo follow up//would like gi referral//JL)    HPI  History of Present Illness    CHIEF COMPLAINT:  Fredo presents today for follow up    MEDICATIONS:  She is currently on metformin, hormone therapy with estradiol (increased to 1.0 mg), thyroid treatment, and Wegovy (currently at 1.0 mg). She experiences nausea with Wegovy injections, which she administers Friday evenings before bed. She started [facility] treatment with [facility] in October.    MEDICAL HISTORY:  Mammogram completed in October. Last colonoscopy in December 2019 revealed internal and external hemorrhoids.    SPECIALIST CARE:  Recent hematology follow-up with Dr. Wood showed good results. Follow-up appointment scheduled for next week for routine monitoring.    REVIEW OF SYSTEMS:  She denies bladder issues, urinary leakage, or joint problems.      ROS:  General: -fever, -chills, -fatigue, -weight gain, -weight loss  Eyes: -vision changes, -redness, -discharge  ENT: -ear pain, -nasal congestion, -sore throat  Cardiovascular: -chest pain, -palpitations, -lower extremity edema  Respiratory: -cough, -shortness of breath  Gastrointestinal: -abdominal pain, +nausea, -vomiting, -diarrhea, -constipation, -blood in stool  Genitourinary: -dysuria, -hematuria, -frequency  Musculoskeletal: -joint pain, -muscle pain  Skin: -rash, -lesion  Neurological: -headache, -dizziness, -numbness, -tingling  Psychiatric: -anxiety, -depression, -sleep difficulty         Lab Visit on 10/28/2024   Component Date Value Ref Range Status    WBC 10/28/2024 6.71  3.90 - 12.70 K/uL Final    RBC 10/28/2024 4.61  4.00 - 5.40 M/uL Final    Hemoglobin 10/28/2024 13.4  12.0 - 16.0 g/dL Final    Hematocrit 10/28/2024 42.0  37.0 - 48.5 % Final    MCV 10/28/2024 91  82 - 98 fL Final    MCH 10/28/2024 29.1  27.0 - 31.0 pg Final    MCHC 10/28/2024 31.9 (L)  32.0  - 36.0 g/dL Final    RDW 10/28/2024 13.0  11.5 - 14.5 % Final    Platelets 10/28/2024 307  150 - 450 K/uL Final    MPV 10/28/2024 8.8 (L)  9.2 - 12.9 fL Final    Immature Granulocytes 10/28/2024 0.1  0.0 - 0.5 % Final    Gran # (ANC) 10/28/2024 3.8  1.8 - 7.7 K/uL Final    Immature Grans (Abs) 10/28/2024 0.01  0.00 - 0.04 K/uL Final    Lymph # 10/28/2024 2.3  1.0 - 4.8 K/uL Final    Mono # 10/28/2024 0.5  0.3 - 1.0 K/uL Final    Eos # 10/28/2024 0.1  0.0 - 0.5 K/uL Final    Baso # 10/28/2024 0.05  0.00 - 0.20 K/uL Final    nRBC 10/28/2024 0  0 /100 WBC Final    Gran % 10/28/2024 56.3  38.0 - 73.0 % Final    Lymph % 10/28/2024 34.4  18.0 - 48.0 % Final    Mono % 10/28/2024 6.9  4.0 - 15.0 % Final    Eosinophil % 10/28/2024 1.6  0.0 - 8.0 % Final    Basophil % 10/28/2024 0.7  0.0 - 1.9 % Final    Differential Method 10/28/2024 Automated   Final    Sodium 10/28/2024 137  136 - 145 mmol/L Final    Potassium 10/28/2024 4.4  3.5 - 5.1 mmol/L Final    Chloride 10/28/2024 100  95 - 110 mmol/L Final    CO2 10/28/2024 30 (H)  23 - 29 mmol/L Final    Glucose 10/28/2024 114 (H)  70 - 110 mg/dL Final    BUN 10/28/2024 17  6 - 20 mg/dL Final    Creatinine 10/28/2024 0.6  0.5 - 1.4 mg/dL Final    Calcium 10/28/2024 10.4  8.7 - 10.5 mg/dL Final    Total Protein 10/28/2024 7.3  6.0 - 8.4 g/dL Final    Albumin 10/28/2024 4.3  3.5 - 5.2 g/dL Final    Total Bilirubin 10/28/2024 0.4  0.1 - 1.0 mg/dL Final    Alkaline Phosphatase 10/28/2024 62  55 - 135 U/L Final    AST 10/28/2024 15  10 - 40 U/L Final    ALT 10/28/2024 13  10 - 44 U/L Final    eGFR 10/28/2024 >60.0  >60 mL/min/1.73 m^2 Final    Anion Gap 10/28/2024 7 (L)  8 - 16 mmol/L Final    Iron 10/28/2024 56  30 - 160 ug/dL Final    Transferrin 10/28/2024 242  200 - 375 mg/dL Final    TIBC 10/28/2024 339  250 - 450 ug/dL Final    Saturated Iron 10/28/2024 17 (L)  20 - 50 % Final    Ferritin 10/28/2024 79.8  20.0 - 300.0 ng/mL Final    Vitamin B-12 10/28/2024 640  210 - 950 pg/mL  Final    Folate 10/28/2024 15.0  4.0 - 24.0 ng/mL Final    Methlymalonic Acid 10/28/2024 140  0 - 378 nmol/L Final    Vit D, 25-Hydroxy 10/28/2024 58  30 - 96 ng/mL Final    [facility] Miscellaneous Test 10/28/2024 COMMENT   Final    [facility] Test Code: 10/28/2024 980532   Final    [facility] Test Name: 10/28/2024 Hereditary Hemochromatosis, DNA Analysis   Final   Clinical Support on 08/07/2024   Component Date Value Ref Range Status    POC Cholesterol, Total 08/07/2024 189  <240 MG/DL Final    POC Cholesterol, HDL 08/07/2024 50  MG/DL Final    POC Cholesterol, LDL 08/07/2024 130  <160 MG/DL Final    POC Triglycerides 08/07/2024 46  <160 MG/DL Final    POC Glucose 08/07/2024 83  60 - 140 MG/DL Final       Past Medical History:   Diagnosis Date    Elevated ferritin 10/24/2024    Elevated vitamin B12 level 10/24/2024    Obesity     Other hyperlipidemia 07/03/2024     Social History     Socioeconomic History    Marital status: Single   Tobacco Use    Smoking status: Never    Smokeless tobacco: Never   Substance and Sexual Activity    Alcohol use: Never    Drug use: Never     Social Drivers of Health     Financial Resource Strain: Low Risk  (8/5/2024)    Overall Financial Resource Strain (CARDIA)     Difficulty of Paying Living Expenses: Not hard at all   Food Insecurity: No Food Insecurity (8/5/2024)    Hunger Vital Sign     Worried About Running Out of Food in the Last Year: Never true     Ran Out of Food in the Last Year: Never true   Physical Activity: Insufficiently Active (8/5/2024)    Exercise Vital Sign     Days of Exercise per Week: 2 days     Minutes of Exercise per Session: 30 min   Stress: Stress Concern Present (8/5/2024)    [employer] - Occupational Stress Questionnaire     Feeling of Stress : Rather much   Housing Stability: Unknown (8/5/2024)    Housing Stability Vital Sign     Unable to Pay for Housing in the Last Year: No     Past Surgical History:   Procedure Laterality Date     CHOLECYSTECTOMY      HYSTERECTOMY      TUBAL LIGATION       Family History   Problem Relation Name Age of Onset    Hypertension Mother      Hyperlipidemia Mother      Hypertension Brother      Hypertension Maternal Grandmother         The CVD Risk score (AUSTIN'Agostino, et al., 2008) failed to calculate for the following reasons:    Cannot find a previous HDL lab    Cannot find a previous total cholesterol lab    Tests to Keep You Healthy    Mammogram: Met on 10/3/2024  Colon Cancer Screening: DUE  Last Blood Pressure <= 139/89 (2/6/2025): Yes      Review of patient's allergies indicates:  No Known Allergies    Current Outpatient Medications:     cyclobenzaprine (FLEXERIL) 10 MG tablet, Take 1 tablet (10 mg total) by mouth 3 (three) times daily as needed for Muscle spasms., Disp: 60 tablet, Rfl: 1    estradioL (ESTRACE) 1 MG tablet, Take 1 oral tablet once per day, Disp: 90 tablet, Rfl: 2    hydroCHLOROthiazide 12.5 MG Tab, Take 1 tablet (12.5 mg total) by mouth once daily., Disp: 90 tablet, Rfl: 1    losartan (COZAAR) 25 MG tablet, Take 1 tablet (25 mg total) by mouth once daily., Disp: 90 tablet, Rfl: 1    metFORMIN (GLUCOPHAGE) 500 MG tablet, Take 1 oral tablet twice per day, Disp: 180 tablet, Rfl: 4    NP THYROID 30 mg Tab, Take 1 tablet (30 mg total) by mouth once daily on empty stomach. Wait 30 minutes before consuming any food or beverage, Disp: 30 tablet, Rfl: 0    potassium chloride (KLOR-CON) 10 MEQ TbSR, Take 1 tablet (10 mEq total) by mouth once daily. for 180 doses, Disp: 90 tablet, Rfl: 1    semaglutide, weight loss, (WEGOVY) 1.7 mg/0.75 mL PnIj, Inject 1.7 mg into the skin every 7 days., Disp: 3 mL, Rfl: 0    valACYclovir (VALTREX) 500 MG tablet, Take 1 tablet (500 mg total) by mouth 2 (two) times daily. (Patient taking differently: Take 500 mg by mouth as needed.), Disp: 180 tablet, Rfl: 1    zolpidem (AMBIEN) 10 mg Tab, Take 1 tablet (10 mg total) by mouth every evening., Disp: 30 tablet, Rfl: 2     "atorvastatin (LIPITOR) 10 MG tablet, Take 1 tablet (10 mg total) by mouth every evening., Disp: 90 tablet, Rfl: 3    citric acid-potassium citrate (POLYCITRA) 1,100-334 mg/5 mL solution, Take 5 mLs by mouth 3 (three) times daily with meals. (Patient not taking: Reported on 2/6/2025), Disp: , Rfl:     promethazine (PHENERGAN) 12.5 MG Tab, Take 1 tablet (12.5 mg total) by mouth every 12 (twelve) hours as needed (nausea)., Disp: 20 tablet, Rfl: 1    semaglutide, weight loss, (WEGOVY) 2.4 mg/0.75 mL PnIj, Inject 2.4 mg into the skin every 7 days., Disp: 3 mL, Rfl: 2    Review of Systems   Constitutional:  Negative for activity change and unexpected weight change.   HENT:  Negative for hearing loss, rhinorrhea and trouble swallowing.    Eyes:  Negative for discharge and visual disturbance.   Respiratory:  Negative for chest tightness and wheezing.    Cardiovascular:  Negative for chest pain and palpitations.   Gastrointestinal:  Negative for blood in stool, constipation, diarrhea and vomiting.   Endocrine: Negative for polydipsia and polyuria.   Genitourinary:  Negative for difficulty urinating, dysuria, hematuria and menstrual problem.   Musculoskeletal:  Negative for arthralgias, joint swelling and neck pain.   Neurological:  Negative for weakness and headaches.   Psychiatric/Behavioral:  Negative for confusion and dysphoric mood.            Objective:      Vitals:    02/06/25 1157   BP: 102/64   Pulse: 80   SpO2: 97%   Weight: 85.4 kg (188 lb 4.8 oz)   Height: 5' 2" (1.575 m)     Physical Exam  Vitals and nursing note reviewed.   Constitutional:       General: She is not in acute distress.     Appearance: Normal appearance. She is well-developed. She is obese. She is not ill-appearing.   HENT:      Head: Normocephalic and atraumatic.      Right Ear: External ear normal.      Left Ear: External ear normal.      Nose: Nose normal.      Mouth/Throat:      Lips: Pink.      Pharynx: Oropharynx is clear.   Eyes:      " General: No scleral icterus.     Pupils: Pupils are equal, round, and reactive to light.   Neck:      Thyroid: No thyromegaly.      Vascular: No carotid bruit.   Cardiovascular:      Rate and Rhythm: Normal rate and regular rhythm.      Pulses:           Radial pulses are 2+ on the right side and 2+ on the left side.      Heart sounds: Normal heart sounds. No murmur heard.  Pulmonary:      Effort: Pulmonary effort is normal.      Breath sounds: Normal breath sounds.   Abdominal:      General: Bowel sounds are normal.      Palpations: Abdomen is soft.      Tenderness: There is no abdominal tenderness.   Musculoskeletal:         General: Normal range of motion.      Cervical back: Normal range of motion.      Lumbar back: Normal. No spasms.      Right lower leg: No edema.      Left lower leg: No edema.   Skin:     General: Skin is warm and dry.      Capillary Refill: Capillary refill takes less than 2 seconds.   Neurological:      General: No focal deficit present.      Mental Status: She is alert and oriented to person, place, and time.      Cranial Nerves: No cranial nerve deficit.      Sensory: Sensation is intact.      Motor: No weakness.      Gait: Gait is intact.   Psychiatric:         Attention and Perception: Attention normal.         Mood and Affect: Mood normal.         Speech: Speech normal.         Behavior: Behavior is cooperative.         Thought Content: Thought content does not include homicidal or suicidal ideation.       Physical Exam              Assessment:       1. Primary hypertension    2. Other hyperlipidemia    3. Nausea    4. Thyroid dysfunction    5. Prediabetes    6. Wellness examination    7. Encounter for lipid screening for cardiovascular disease         Plan:       Primary hypertension  -     Microalbumin/Creatinine Ratio, Urine; Future; Expected date: 07/06/2025    Other hyperlipidemia  -     atorvastatin (LIPITOR) 10 MG tablet; Take 1 tablet (10 mg total) by mouth every evening.   Dispense: 90 tablet; Refill: 3    Nausea  -     promethazine (PHENERGAN) 12.5 MG Tab; Take 1 tablet (12.5 mg total) by mouth every 12 (twelve) hours as needed (nausea).  Dispense: 20 tablet; Refill: 1    Thyroid dysfunction  -     T4, Free; Future; Expected date: 07/06/2025  -     T3, Free; Future; Expected date: 07/06/2025    Prediabetes  -     Hemoglobin A1C; Future; Expected date: 07/06/2025    Wellness examination  -     CBC Auto Differential; Future; Expected date: 07/06/2025  -     Comprehensive Metabolic Panel; Future; Expected date: 07/06/2025  -     Lipid Panel; Future; Expected date: 07/06/2025  -     TSH; Future; Expected date: 07/06/2025    Encounter for lipid screening for cardiovascular disease  -     Lipid Panel; Future; Expected date: 07/06/2025      Assessment & Plan    IMPRESSION:  - Reviewed recent labs from October, including iron levels  - Assessed need for colonoscopy screening based on last procedure in 2019  - Evaluated current medication regimen, including hormone optimization and thyroid medication managed by [facility] ([facility])  - Considered nausea management options for weekly injections  - Discussed timing of colonoscopy in relation to Wegovy use to avoid potential complications like ileus  - Noted upcoming hematology follow-up visit    OBESITY, CLASS 3:  - Continue Wegovy (semaglutide) injections for weight management, currently at 1.0 mg dose with planned increases to 1.7 mg, then 2.4 mg in the coming months.  - Prescribed metformin for additional weight management support.  - Prescribed promethazine as an anti-nausea medication for use with Wegovy injections.  - Explained potential risks of continuing Wegovy close to colonoscopy, including slowed bowel function and possible ileus.    DIABETES:  - Fredo is doing well with diabetes management through [facility] since October.  - Scheduled follow-up visit with [facility]ena in April or May to reassess medication.    THYROID  DISORDER:  - Continued monitoring of thyroid function and maintained current thyroid medication as part of hormone optimization treatment.    COLONOSCOPY FOLLOW-UP:  - Fredo's last colonoscopy was in December 2019 in [place], which found internal and external hemorrhoids.  - Discussed need for follow-up colonoscopy based on previous findings and risk factors.  - Planned to obtain previous colonoscopy results and determine timing for next screening.  - Discussed importance of proper bowel preparation before colonoscopy.    HYPERLIPIDEMIA:  - Refilled atorvastatin (Lipitor) for hyperlipidemia management.  - Planned to check cholesterol yearly.    HYPERTENSION:  - Continued Losartan with 1 refill remaining for hypertension management.  - Performed cardiac exam, finding regular rate and rhythm with no murmurs.    BACK PAIN:  - Fredo is taking Flexeril as needed for back pain.  - Confirmed Flexeril prescription has one refill remaining.    FOLLOW UP:  - Follow up in 6 months unless needed sooner.  - Will review any labs done by Beena ([facility]) and share results with her.  - Plan to conduct yearly lab work at next visit if not done by other providers.         Follow up in about 6 months (around 8/6/2025), or if symptoms worsen or fail to improve, for HTN, HLD.        This note was generated with the assistance of ambient listening technology. Verbal consent was obtained by the patient and accompanying visitor(s) for the recording of patient appointment to facilitate this note. I attest to having reviewed and edited the generated note for accuracy, though some syntax or spelling errors may persist. Please contact the author of this note for any clarification.      2/6/2025 Deisy Luong

## 2025-02-10 ENCOUNTER — PATIENT MESSAGE (OUTPATIENT)
Dept: ADMINISTRATIVE | Facility: OTHER | Age: 55
End: 2025-02-10
Payer: COMMERCIAL

## 2025-02-11 ENCOUNTER — LAB VISIT (OUTPATIENT)
Dept: LAB | Facility: HOSPITAL | Age: 55
End: 2025-02-11
Attending: INTERNAL MEDICINE
Payer: COMMERCIAL

## 2025-02-11 DIAGNOSIS — R79.89 ELEVATED FERRITIN: ICD-10-CM

## 2025-02-11 DIAGNOSIS — R79.89 ELEVATED FERRITIN LEVEL: ICD-10-CM

## 2025-02-11 DIAGNOSIS — R79.89 ELEVATED VITAMIN B12 LEVEL: ICD-10-CM

## 2025-02-11 LAB
25(OH)D3+25(OH)D2 SERPL-MCNC: 44 NG/ML (ref 30–96)
FERRITIN SERPL-MCNC: 124.4 NG/ML (ref 20–300)
FOLATE SERPL-MCNC: 20.3 NG/ML (ref 4–24)
IRON SERPL-MCNC: 71 UG/DL (ref 30–160)
SATURATED IRON: 22 % (ref 20–50)
TOTAL IRON BINDING CAPACITY: 318 UG/DL (ref 250–450)
TRANSFERRIN SERPL-MCNC: 227 MG/DL (ref 200–375)
VIT B12 SERPL-MCNC: 714 PG/ML (ref 210–950)

## 2025-02-11 PROCEDURE — 82607 VITAMIN B-12: CPT | Performed by: INTERNAL MEDICINE

## 2025-02-11 PROCEDURE — 82746 ASSAY OF FOLIC ACID SERUM: CPT | Performed by: INTERNAL MEDICINE

## 2025-02-11 PROCEDURE — 36415 COLL VENOUS BLD VENIPUNCTURE: CPT | Performed by: INTERNAL MEDICINE

## 2025-02-11 PROCEDURE — 82306 VITAMIN D 25 HYDROXY: CPT | Performed by: INTERNAL MEDICINE

## 2025-02-11 PROCEDURE — 82728 ASSAY OF FERRITIN: CPT | Performed by: INTERNAL MEDICINE

## 2025-02-11 PROCEDURE — 84466 ASSAY OF TRANSFERRIN: CPT | Performed by: INTERNAL MEDICINE

## 2025-02-18 ENCOUNTER — TELEPHONE (OUTPATIENT)
Dept: GASTROENTEROLOGY | Facility: CLINIC | Age: 55
End: 2025-02-18
Payer: COMMERCIAL

## 2025-02-18 ENCOUNTER — OFFICE VISIT (OUTPATIENT)
Facility: CLINIC | Age: 55
End: 2025-02-18
Payer: COMMERCIAL

## 2025-02-18 VITALS
HEIGHT: 62 IN | WEIGHT: 188 LBS | TEMPERATURE: 97 F | OXYGEN SATURATION: 97 % | HEART RATE: 94 BPM | SYSTOLIC BLOOD PRESSURE: 102 MMHG | DIASTOLIC BLOOD PRESSURE: 72 MMHG | RESPIRATION RATE: 16 BRPM | BODY MASS INDEX: 34.6 KG/M2

## 2025-02-18 DIAGNOSIS — R79.89 ELEVATED VITAMIN B12 LEVEL: ICD-10-CM

## 2025-02-18 DIAGNOSIS — Z83.719 FAMILY HISTORY OF POLYPS IN THE COLON: ICD-10-CM

## 2025-02-18 DIAGNOSIS — R79.89 ELEVATED FERRITIN: Primary | ICD-10-CM

## 2025-02-18 DIAGNOSIS — Z98.890 H/O COLONOSCOPY: ICD-10-CM

## 2025-02-18 DIAGNOSIS — R73.03 PREDIABETES: ICD-10-CM

## 2025-02-18 NOTE — PROGRESS NOTES
[facility]  Follow Up   Subjective:      Patient ID:   NAME: Fredo Chacon : 1970     54 y.o. female    Referring Doc: Deisy Luong APRN*  Other Physicians: Beena Howard NP (GYN)        Chief Complaint: elevated ferritin/B12 - follow up       HPI:  54 y.o. female with diagnosis of elevated ferritin/B12 levels who has been referred by Deisy Luong APRN* for evaluation by medical hematology/oncology. She is here by herself.   She is doing well today no complaints.     She saw GYN about her menopause issues and had some basic labs done. She increased the patient's estradiol and started her on metformin and thyroid medication. She sees a midwife at [facility].     She finds that her energy is better.     She is also on Wegovy, but she has reached her goal weight.     No tobacco or alcohol    ROS:   GEN: normal without any fever, night sweats or weight loss; fatigue has improved   HEENT: normal with no HA's, sore throat, stiff neck, changes in vision  CV: normal with no CP, SOB, PND, STEVEN or orthopnea  PULM: normal with no SOB, cough, hemoptysis, sputum or pleuritic pain  GI: normal with no abdominal pain, nausea, vomiting, constipation, diarrhea, melanotic stools, BRBPR, or hematemesis  : normal with no hematuria, dysuria  BREAST: normal with no mass, discharge, pain  SKIN: normal with no rash, erythema, bruising, or swelling       Past Medical/Surgical History:  Past Medical History:   Diagnosis Date    Elevated ferritin 10/24/2024    Elevated vitamin B12 level 10/24/2024    Obesity     Other hyperlipidemia 2024     Past Surgical History:   Procedure Laterality Date    CHOLECYSTECTOMY      HYSTERECTOMY      TUBAL LIGATION           Allergies:  Review of patient's allergies indicates:  No Known Allergies    Social/Family History:  Social History     Socioeconomic History    Marital status: Single   Tobacco Use    Smoking status: Never    Smokeless tobacco: Never    Substance and Sexual Activity    Alcohol use: Never    Drug use: Never     Social Drivers of Health     Financial Resource Strain: Low Risk  (2/11/2025)    Overall Financial Resource Strain (CARDIA)     Difficulty of Paying Living Expenses: Not hard at all   Food Insecurity: No Food Insecurity (2/11/2025)    Hunger Vital Sign     Worried About Running Out of Food in the Last Year: Never true     Ran Out of Food in the Last Year: Never true   Transportation Needs: No Transportation Needs (2/11/2025)    PRAPARE - Transportation     Lack of Transportation (Medical): No     Lack of Transportation (Non-Medical): No   Physical Activity: Sufficiently Active (2/11/2025)    Exercise Vital Sign     Days of Exercise per Week: 5 days     Minutes of Exercise per Session: 30 min   Stress: No Stress Concern Present (2/11/2025)    [employer] [place] [employer] - Occupational Stress Questionnaire     Feeling of Stress : Only a little   Housing Stability: Low Risk  (2/11/2025)    Housing Stability Vital Sign     Unable to Pay for Housing in the Last Year: No     Number of Times Moved in the Last Year: 0     Homeless in the Last Year: No     Family History   Problem Relation Name Age of Onset    Hypertension Mother      Hyperlipidemia Mother      Hypertension Brother      Hypertension Maternal Grandmother           Medications:    Current Outpatient Medications:     atorvastatin (LIPITOR) 10 MG tablet, Take 1 tablet (10 mg total) by mouth every evening., Disp: 90 tablet, Rfl: 3    cyclobenzaprine (FLEXERIL) 10 MG tablet, Take 1 tablet (10 mg total) by mouth 3 (three) times daily as needed for Muscle spasms., Disp: 60 tablet, Rfl: 1    estradioL (ESTRACE) 1 MG tablet, Take 1 oral tablet once per day, Disp: 90 tablet, Rfl: 2    hydroCHLOROthiazide 12.5 MG Tab, Take 1 tablet (12.5 mg total) by mouth once daily., Disp: 90 tablet, Rfl: 1    losartan (COZAAR) 25 MG tablet, Take 1 tablet (25 mg total) by mouth once daily., Disp:  "90 tablet, Rfl: 1    metFORMIN (GLUCOPHAGE) 500 MG tablet, Take 1 oral tablet twice per day, Disp: 180 tablet, Rfl: 4    NP THYROID 30 mg Tab, Take 1 tablet (30 mg total) by mouth once daily on empty stomach. Wait 30 minutes before consuming any food or beverage, Disp: 30 tablet, Rfl: 0    NP THYROID 30 mg Tab, Take 1 oral tablet once per day on empty stomach. Wait 30 minutes before consuming any food or beverage, Disp: 90 tablet, Rfl: 4    potassium chloride (KLOR-CON) 10 MEQ TbSR, Take 1 tablet (10 mEq total) by mouth once daily. for 180 doses, Disp: 90 tablet, Rfl: 1    promethazine (PHENERGAN) 12.5 MG Tab, Take 1 tablet (12.5 mg total) by mouth every 12 (twelve) hours as needed (nausea)., Disp: 20 tablet, Rfl: 1    semaglutide, weight loss, (WEGOVY) 1.7 mg/0.75 mL PnIj, Inject 1.7 mg into the skin every 7 days., Disp: 3 mL, Rfl: 0    semaglutide, weight loss, (WEGOVY) 2.4 mg/0.75 mL PnIj, Inject 2.4 mg into the skin every 7 days., Disp: 3 mL, Rfl: 2    valACYclovir (VALTREX) 500 MG tablet, Take 1 tablet (500 mg total) by mouth 2 (two) times daily. (Patient taking differently: Take 500 mg by mouth as needed.), Disp: 180 tablet, Rfl: 1    zolpidem (AMBIEN) 10 mg Tab, Take 1 tablet (10 mg total) by mouth every evening., Disp: 30 tablet, Rfl: 2    citric acid-potassium citrate (POLYCITRA) 1,100-334 mg/5 mL solution, Take 5 mLs by mouth 3 (three) times daily with meals. (Patient not taking: Reported on 2/6/2025), Disp: , Rfl:       Pathology:   Cancer Staging   No matching staging information was found for the patient.        Objective:   Vitals:  Blood pressure 102/72, pulse 94, temperature 97.3 °F (36.3 °C), temperature source Temporal, resp. rate 16, height 5' 2" (1.575 m), weight 85.3 kg (188 lb), SpO2 97%.    Physical Examination:   GEN: no apparent distress, comfortable; AAOx3;   HEAD: atraumatic and normocephalic  EYES: no pallor, no icterus, PERRLA  ENT: OMM, no pharyngeal erythema, external ears WNL; no " nasal discharge; no thrush  NECK: no masses, thyroid normal, trachea midline, no LAD/LN's, supple  CV: RRR with no murmur; normal pulse; normal S1 and S2; no pedal edema  CHEST: Normal respiratory effort; CTAB; normal breath sounds; no wheeze or crackles  ABDOM: nontender and nondistended; soft; normal bowel sounds; no rebound/guarding  MUSC/Skeletal: ROM normal; no crepitus; joints normal; no deformities or arthropathy  EXTREM: no clubbing, cyanosis, inflammation or swelling  SKIN: no rashes, lesions, ulcers, petechiae or subcutaneous nodules  : no orr  NEURO: grossly intact; motor/sensory WNL; AAOx3; no tremors  PSYCH: normal mood, affect and behavior  LYMPH: normal cervical, supraclavicular, axillary and groin LN's      Labs:     Component      Latest Ref Rng 2/11/2025   Iron      30 - 160 ug/dL 71    Transferrin      200 - 375 mg/dL 227    TIBC      250 - 450 ug/dL 318    Saturated Iron      20 - 50 % 22    Ferritin      20.0 - 300.0 ng/mL 124.4    Vitamin B12      210 - 950 pg/mL 714    Folate      4.0 - 24.0 ng/mL 20.3    Vitamin D      30 - 96 ng/mL 44          Radiology/Diagnostic Studies:    FINDINGS:  There are scattered areas of fibroglandular density.   There are no suspicious masses, microcalcifications, or architectural distortion.     Impression:     Negative mammogram. Annual screening mammography is recommended.     BI-RADS CATEGORY 1: NEGATIVE.     Tyrer-Cuzick lifetime risk calculated at 6.54 %.     Women with a greater than 20% lifetime risk for breast cancer may benefit from supplemental screening including annual breast magnetic resonance imaging (MRI), in addition to annual mammography.        Electronically signed by:Luis Eduardo Trevino  Date:                                            10/04/2024  Time:                                           08:55      All lab results and imaging results have been reviewed and discussed with the patient    Assessment:   (1) 54 y.o. female with diagnosis of  elevated ferritin/B12 levels who has been referred by Deisy Luong APRN* for evaluation by medical hematology/oncology.     - ferritin was 201 and only mildly elevated - could be an acute phase reactant due to inflammation, arthritis, etc; will check complete iron panel and order hemochromatosis gene study out of abundance of precaution  - B12 level was 1353 and only mildly elevated - B12 is a water soluble vitamin that is obtained from the foods we eat; excess is urinated out  - CBC is adequate and WNL  - she had colonoscopy about 5 yrs ago and was good except for internal hemorrhoids     (2) HTN and hypercholesterolemia    (3) Prediabetic    (4) Obesity - on phentermine     (5) Hx/of HSV infection    (6) Insomnia     (7) Hx/of hysterectomy (2009)    (8) Hx/of cholecystectomy ('85)          VISIT DIAGNOSES:     Elevated ferritin  -     CBC Auto Differential; Standing  -     CMP; Standing    Elevated vitamin B12 level    H/O colonoscopy  -     Ambulatory referral/consult to Gastroenterology; Future; Expected date: 02/25/2025    Family history of polyps in the colon  -     Ambulatory referral/consult to Gastroenterology; Future; Expected date: 02/25/2025            Plan:     PLAN:  Check full iron panel and CBC and monitor every 3 months for now  Cbc due today, ferritin at 124.4, B12 at 714, and iron panel normal range   Hemocromotosis panel negative and reviewed patient   F/u with GYN and PCP  Recommend she f/u with GI as directed for routine colonoscopy surveillance etc - referral placed due to colonscopy report in [facility] from [place] stating torepeat in 5 years from 2020  A1c today due to change in meds - Gyn added meformin and she was already on wegovy      RTC in  12 weeks       I have explained and the patient understands all of  the current recommendation(s). I have answered all of their questions to the best of my ability and to their complete satisfaction.           Thank you for allowing me to  participate in this patient's care. Please call with any questions or concerns.    Electronically signed Daya Castaneda NP    Answers submitted by the patient for this visit:  Review of Systems Questionnaire (Submitted on 10/19/2024)  appetite change : No  unexpected weight change: No  mouth sores: No  visual disturbance: No  cough: No  shortness of breath: No  chest pain: No  abdominal pain: No  diarrhea: No  frequency: No  back pain: No  rash: No  headaches: No  adenopathy: No  nervous/ anxious: No

## 2025-02-19 ENCOUNTER — LAB VISIT (OUTPATIENT)
Dept: LAB | Facility: HOSPITAL | Age: 55
End: 2025-02-19
Attending: NURSE PRACTITIONER
Payer: COMMERCIAL

## 2025-02-19 ENCOUNTER — TELEPHONE (OUTPATIENT)
Facility: CLINIC | Age: 55
End: 2025-02-19
Payer: COMMERCIAL

## 2025-02-19 ENCOUNTER — LAB VISIT (OUTPATIENT)
Dept: LAB | Facility: HOSPITAL | Age: 55
End: 2025-02-19
Attending: INTERNAL MEDICINE
Payer: COMMERCIAL

## 2025-02-19 DIAGNOSIS — R79.89 ELEVATED FERRITIN: Primary | ICD-10-CM

## 2025-02-19 DIAGNOSIS — R73.03 PREDIABETES: ICD-10-CM

## 2025-02-19 DIAGNOSIS — D72.829 LEUKOCYTOSIS, UNSPECIFIED TYPE: ICD-10-CM

## 2025-02-19 DIAGNOSIS — R79.89 ELEVATED FERRITIN LEVEL: ICD-10-CM

## 2025-02-19 DIAGNOSIS — R79.89 ELEVATED VITAMIN B12 LEVEL: ICD-10-CM

## 2025-02-19 DIAGNOSIS — R79.89 ELEVATED FERRITIN: ICD-10-CM

## 2025-02-19 LAB
ALBUMIN SERPL BCP-MCNC: 4 G/DL (ref 3.5–5.2)
ALP SERPL-CCNC: 54 U/L (ref 55–135)
ALT SERPL W/O P-5'-P-CCNC: 29 U/L (ref 10–44)
ANION GAP SERPL CALC-SCNC: 7 MMOL/L (ref 8–16)
ANISOCYTOSIS BLD QL SMEAR: SLIGHT
AST SERPL-CCNC: 22 U/L (ref 10–40)
BASOPHILS # BLD AUTO: 0.01 K/UL (ref 0–0.2)
BASOPHILS # BLD AUTO: ABNORMAL K/UL (ref 0–0.2)
BASOPHILS NFR BLD: 0 % (ref 0–1.9)
BASOPHILS NFR BLD: 0.5 % (ref 0–1.9)
BILIRUB SERPL-MCNC: 0.4 MG/DL (ref 0.1–1)
BUN SERPL-MCNC: 8 MG/DL (ref 6–20)
BURR CELLS BLD QL SMEAR: ABNORMAL
CALCIUM SERPL-MCNC: 8.9 MG/DL (ref 8.7–10.5)
CHLORIDE SERPL-SCNC: 102 MMOL/L (ref 95–110)
CO2 SERPL-SCNC: 30 MMOL/L (ref 23–29)
CREAT SERPL-MCNC: 0.7 MG/DL (ref 0.5–1.4)
DACRYOCYTES BLD QL SMEAR: ABNORMAL
DIFFERENTIAL METHOD BLD: ABNORMAL
DIFFERENTIAL METHOD BLD: ABNORMAL
EOSINOPHIL # BLD AUTO: 0 K/UL (ref 0–0.5)
EOSINOPHIL # BLD AUTO: ABNORMAL K/UL (ref 0–0.5)
EOSINOPHIL NFR BLD: 0.5 % (ref 0–8)
EOSINOPHIL NFR BLD: 1 % (ref 0–8)
ERYTHROCYTE [DISTWIDTH] IN BLOOD BY AUTOMATED COUNT: 13.2 % (ref 11.5–14.5)
ERYTHROCYTE [DISTWIDTH] IN BLOOD BY AUTOMATED COUNT: 13.3 % (ref 11.5–14.5)
EST. GFR  (NO RACE VARIABLE): >60 ML/MIN/1.73 M^2
ESTIMATED AVG GLUCOSE: 111 MG/DL (ref 68–131)
GLUCOSE SERPL-MCNC: 85 MG/DL (ref 70–110)
HBA1C MFR BLD: 5.5 % (ref 4.5–6.2)
HCT VFR BLD AUTO: 41.9 % (ref 37–48.5)
HCT VFR BLD AUTO: 42.8 % (ref 37–48.5)
HGB BLD-MCNC: 13.3 G/DL (ref 12–16)
HGB BLD-MCNC: 13.4 G/DL (ref 12–16)
IMM GRANULOCYTES # BLD AUTO: 0 K/UL (ref 0–0.04)
IMM GRANULOCYTES # BLD AUTO: ABNORMAL K/UL (ref 0–0.04)
IMM GRANULOCYTES NFR BLD AUTO: 0 % (ref 0–0.5)
IMM GRANULOCYTES NFR BLD AUTO: ABNORMAL % (ref 0–0.5)
LYMPHOCYTES # BLD AUTO: 1.4 K/UL (ref 1–4.8)
LYMPHOCYTES # BLD AUTO: ABNORMAL K/UL (ref 1–4.8)
LYMPHOCYTES NFR BLD: 65.9 % (ref 18–48)
LYMPHOCYTES NFR BLD: 66 % (ref 18–48)
MCH RBC QN AUTO: 28.3 PG (ref 27–31)
MCH RBC QN AUTO: 28.6 PG (ref 27–31)
MCHC RBC AUTO-ENTMCNC: 31.3 G/DL (ref 32–36)
MCHC RBC AUTO-ENTMCNC: 31.7 G/DL (ref 32–36)
MCV RBC AUTO: 90 FL (ref 82–98)
MCV RBC AUTO: 90 FL (ref 82–98)
MISCELLANEOUS TEST NAME: NORMAL
MONOCYTES # BLD AUTO: 0.3 K/UL (ref 0.3–1)
MONOCYTES # BLD AUTO: ABNORMAL K/UL (ref 0.3–1)
MONOCYTES NFR BLD: 11.7 % (ref 4–15)
MONOCYTES NFR BLD: 15 % (ref 4–15)
NEUTROPHILS # BLD AUTO: 0.5 K/UL (ref 1.8–7.7)
NEUTROPHILS NFR BLD: 16 % (ref 38–73)
NEUTROPHILS NFR BLD: 21.4 % (ref 38–73)
NEUTS BAND NFR BLD MANUAL: 2 %
NRBC BLD-RTO: 0 /100 WBC
NRBC BLD-RTO: 0 /100 WBC
OVALOCYTES BLD QL SMEAR: ABNORMAL
PLATELET # BLD AUTO: 248 K/UL (ref 150–450)
PLATELET # BLD AUTO: 258 K/UL (ref 150–450)
PLATELET BLD QL SMEAR: ABNORMAL
PMV BLD AUTO: 9 FL (ref 9.2–12.9)
PMV BLD AUTO: 9.1 FL (ref 9.2–12.9)
POTASSIUM SERPL-SCNC: 3.5 MMOL/L (ref 3.5–5.1)
PROT SERPL-MCNC: 6.2 G/DL (ref 6–8.4)
RBC # BLD AUTO: 4.65 M/UL (ref 4–5.4)
RBC # BLD AUTO: 4.74 M/UL (ref 4–5.4)
REFERENCE LAB: NORMAL
SODIUM SERPL-SCNC: 139 MMOL/L (ref 136–145)
SPECIMEN TYPE: NORMAL
TEST RESULT: NORMAL
WBC # BLD AUTO: 1.91 K/UL (ref 3.9–12.7)
WBC # BLD AUTO: 2.14 K/UL (ref 3.9–12.7)

## 2025-02-19 PROCEDURE — 36415 COLL VENOUS BLD VENIPUNCTURE: CPT | Performed by: NURSE PRACTITIONER

## 2025-02-19 PROCEDURE — 83036 HEMOGLOBIN GLYCOSYLATED A1C: CPT | Performed by: NURSE PRACTITIONER

## 2025-02-19 PROCEDURE — 36415 COLL VENOUS BLD VENIPUNCTURE: CPT | Performed by: INTERNAL MEDICINE

## 2025-02-19 PROCEDURE — 85007 BL SMEAR W/DIFF WBC COUNT: CPT | Performed by: NURSE PRACTITIONER

## 2025-02-19 PROCEDURE — 85025 COMPLETE CBC W/AUTO DIFF WBC: CPT | Performed by: INTERNAL MEDICINE

## 2025-02-19 PROCEDURE — 85027 COMPLETE CBC AUTOMATED: CPT | Performed by: NURSE PRACTITIONER

## 2025-02-19 PROCEDURE — 80053 COMPREHEN METABOLIC PANEL: CPT | Performed by: NURSE PRACTITIONER

## 2025-02-19 NOTE — TELEPHONE ENCOUNTER
WBC 1.91, ANC 0.4 reported by Candice at [facility]. Daya made aware and is not certain that those results are correct and would like a redraw of the CBC and also a flow cytometry. Orders placed. Patient made aware and states she will go have them redrawn.

## 2025-02-24 LAB
LABCORP MISC TEST CODE: NORMAL
LABCORP MISC TEST NAME: NORMAL
LABCORP MISCELLANEOUS TEST: NORMAL

## 2025-02-25 ENCOUNTER — RESULTS FOLLOW-UP (OUTPATIENT)
Facility: CLINIC | Age: 55
End: 2025-02-25
Payer: COMMERCIAL

## 2025-02-26 ENCOUNTER — TELEPHONE (OUTPATIENT)
Facility: CLINIC | Age: 55
End: 2025-02-26
Payer: COMMERCIAL

## 2025-02-26 NOTE — TELEPHONE ENCOUNTER
Spoke to Kimberly at [facility] send out lab to request results of flow cytometry drawn on 2/19. She states she will fax results to my right fax, once received will have Dr Wood review.

## 2025-02-26 NOTE — TELEPHONE ENCOUNTER
----- Message from Eliazar Wood MD sent at 2/25/2025  2:55 PM CST -----  I need someone to print out the flow results  ----- Message -----  From: [facility] Interface  Sent: 2/19/2025  12:01 PM CST  To: Eliazar Wood MD

## 2025-03-01 DIAGNOSIS — G47.00 INSOMNIA, UNSPECIFIED TYPE: ICD-10-CM

## 2025-03-03 RX ORDER — ZOLPIDEM TARTRATE 10 MG/1
10 TABLET ORAL NIGHTLY
Qty: 30 TABLET | Refills: 2 | Status: SHIPPED | OUTPATIENT
Start: 2025-03-03

## 2025-03-04 DIAGNOSIS — I10 PRIMARY HYPERTENSION: ICD-10-CM

## 2025-03-05 ENCOUNTER — TELEPHONE (OUTPATIENT)
Facility: CLINIC | Age: 55
End: 2025-03-05
Payer: COMMERCIAL

## 2025-03-05 ENCOUNTER — PATIENT MESSAGE (OUTPATIENT)
Dept: GASTROENTEROLOGY | Facility: CLINIC | Age: 55
End: 2025-03-05
Payer: COMMERCIAL

## 2025-03-05 RX ORDER — LOSARTAN POTASSIUM 25 MG/1
25 TABLET ORAL DAILY
Qty: 90 TABLET | Refills: 1 | Status: SHIPPED | OUTPATIENT
Start: 2025-03-05 | End: 2025-09-01

## 2025-03-06 ENCOUNTER — TELEPHONE (OUTPATIENT)
Facility: CLINIC | Age: 55
End: 2025-03-06
Payer: COMMERCIAL

## 2025-03-06 DIAGNOSIS — Z12.11 SCREENING FOR COLON CANCER: ICD-10-CM

## 2025-03-06 DIAGNOSIS — Z83.719 FAMILY HISTORY OF POLYPS IN THE COLON: Primary | ICD-10-CM

## 2025-03-13 ENCOUNTER — TELEPHONE (OUTPATIENT)
Dept: INTERNAL MEDICINE | Facility: CLINIC | Age: 55
End: 2025-03-13
Payer: COMMERCIAL

## 2025-03-13 NOTE — TELEPHONE ENCOUNTER
----- Message from Kenya Sánchez sent at 3/11/2025  4:44 PM CDT -----  Regarding: Wegovy  Good evening, Patient informed my technician during refill call that she had stop due to gastro issues and will be following up with a gastrologist in April. She requested to be disenrolled from the program. Closing out at [facility] and discontinuing remaining prescriptions.Gonzalo Greenfield, Clinical Pharmacist

## 2025-03-13 NOTE — TELEPHONE ENCOUNTER
Patient has been closed out of [facility] program due to gastrointestinal issues. Programs updated, medications discontinued, and future visits canceled. Pt to welcome to re-enroll in  MTM program at later date if they would like.

## 2025-03-15 ENCOUNTER — PATIENT MESSAGE (OUTPATIENT)
Dept: ADMINISTRATIVE | Facility: OTHER | Age: 55
End: 2025-03-15
Payer: COMMERCIAL

## 2025-03-19 ENCOUNTER — PATIENT MESSAGE (OUTPATIENT)
Dept: FAMILY MEDICINE | Facility: CLINIC | Age: 55
End: 2025-03-19
Payer: COMMERCIAL

## 2025-04-07 ENCOUNTER — HOSPITAL ENCOUNTER (OUTPATIENT)
Facility: HOSPITAL | Age: 55
Discharge: HOME | End: 2025-04-07
Attending: STUDENT IN AN ORGANIZED HEALTH CARE EDUCATION/TRAINING PROGRAM
Payer: COMMERCIAL

## 2025-04-07 ENCOUNTER — ANESTHESIA EVENT (OUTPATIENT)
Dept: ENDOSCOPY | Facility: HOSPITAL | Age: 55
End: 2025-04-07
Payer: COMMERCIAL

## 2025-04-07 ENCOUNTER — ANESTHESIA (OUTPATIENT)
Dept: ENDOSCOPY | Facility: HOSPITAL | Age: 55
End: 2025-04-07
Payer: COMMERCIAL

## 2025-04-07 ENCOUNTER — TELEPHONE (OUTPATIENT)
Dept: FAMILY MEDICINE | Facility: CLINIC | Age: 55
End: 2025-04-07
Payer: COMMERCIAL

## 2025-04-07 DIAGNOSIS — Z12.11 SPECIAL SCREENING FOR MALIGNANT NEOPLASMS, COLON: Primary | ICD-10-CM

## 2025-04-07 DIAGNOSIS — Z83.719 FAMILY HISTORY OF POLYPS IN THE COLON: ICD-10-CM

## 2025-04-07 PROCEDURE — G0121 COLON CA SCRN NOT HI RSK IND: HCPCS | Mod: ,,, | Performed by: STUDENT IN AN ORGANIZED HEALTH CARE EDUCATION/TRAINING PROGRAM

## 2025-04-07 PROCEDURE — 63600175 PHARM REV CODE 636 W HCPCS: Performed by: REGISTERED NURSE

## 2025-04-07 PROCEDURE — G0121 COLON CA SCRN NOT HI RSK IND: HCPCS | Performed by: STUDENT IN AN ORGANIZED HEALTH CARE EDUCATION/TRAINING PROGRAM

## 2025-04-07 PROCEDURE — 25000003 PHARM REV CODE 250: Performed by: STUDENT IN AN ORGANIZED HEALTH CARE EDUCATION/TRAINING PROGRAM

## 2025-04-07 PROCEDURE — 37000009 HC ANESTHESIA EA ADD 15 MINS: Performed by: STUDENT IN AN ORGANIZED HEALTH CARE EDUCATION/TRAINING PROGRAM

## 2025-04-07 PROCEDURE — 37000008 HC ANESTHESIA 1ST 15 MINUTES: Performed by: STUDENT IN AN ORGANIZED HEALTH CARE EDUCATION/TRAINING PROGRAM

## 2025-04-07 RX ORDER — PROPOFOL 10 MG/ML
VIAL (ML) INTRAVENOUS
Status: DISCONTINUED | OUTPATIENT
Start: 2025-04-07 | End: 2025-04-07

## 2025-04-07 RX ORDER — SODIUM CHLORIDE 9 MG/ML
INJECTION, SOLUTION INTRAVENOUS CONTINUOUS
Status: DISCONTINUED | OUTPATIENT
Start: 2025-04-07 | End: 2025-04-07 | Stop reason: HOSPADM

## 2025-04-07 RX ORDER — LIDOCAINE HYDROCHLORIDE 20 MG/ML
INJECTION INTRAVENOUS
Status: DISCONTINUED | OUTPATIENT
Start: 2025-04-07 | End: 2025-04-07

## 2025-04-07 RX ADMIN — LIDOCAINE HYDROCHLORIDE 50 MG: 20 INJECTION, SOLUTION INTRAVENOUS at 08:04

## 2025-04-07 RX ADMIN — PROPOFOL 30 MG: 10 INJECTION, EMULSION INTRAVENOUS at 08:04

## 2025-04-07 RX ADMIN — PROPOFOL 70 MG: 10 INJECTION, EMULSION INTRAVENOUS at 08:04

## 2025-04-07 RX ADMIN — PROPOFOL 40 MG: 10 INJECTION, EMULSION INTRAVENOUS at 08:04

## 2025-04-07 RX ADMIN — SODIUM CHLORIDE: 9 INJECTION, SOLUTION INTRAVENOUS at 08:04

## 2025-04-07 NOTE — TELEPHONE ENCOUNTER
----- Message from Milady sent at 4/7/2025 11:19 AM CDT -----  There is an error with medication that is in patient chart. In which patient is trying to get removed. Patient states she sent a message through [employer] about this medication,. However, no one has responded. [phone]

## 2025-04-07 NOTE — ANESTHESIA PREPROCEDURE EVALUATION
04/07/2025  Fredo Chacon is a 54 y.o., female.      Pre-op Assessment    I have reviewed the Patient Summary Reports.    I have reviewed the NPO Status.   I have reviewed the Medications.     Review of Systems  Anesthesia Hx:  No problems with previous Anesthesia                Cardiovascular:     Hypertension           hyperlipidemia                         Hypertension         Pulmonary:  Pulmonary Normal                       Neurological:  Neurology Normal                                      Endocrine:        Obesity / BMI > 30      Physical Exam  General: Well nourished    Airway:  Mallampati: II   Mouth Opening: Normal  TM Distance: Normal  Neck ROM: Normal ROM        Anesthesia Plan  Type of Anesthesia, risks & benefits discussed:    Anesthesia Type: Gen Natural Airway  Intra-op Monitoring Plan: Standard ASA Monitors  Induction:  IV  Informed Consent: Informed consent signed with the Patient and all parties understand the risks and agree with anesthesia plan.  All questions answered.   ASA Score: 2    Ready For Surgery From Anesthesia Perspective.     .

## 2025-04-07 NOTE — PLAN OF CARE
Vss, paradise po fluids, denies pain, ambulates easily. IV removed, catheter intact. Discharge instructions provided and states understanding. States ready to go home. Talked to Dr Castañeda at the bedside. Discharged from facility with family per wheelchair.

## 2025-04-07 NOTE — H&P
History & Physical - Short Stay  Gastroenterology      SUBJECTIVE:     Procedure: Colonoscopy    Chief Complaint/Indication for Procedure: Screening    PTA Medications   Medication Sig    atorvastatin (LIPITOR) 10 MG tablet Take 1 tablet (10 mg total) by mouth every evening.    cyclobenzaprine (FLEXERIL) 10 MG tablet Take 1 tablet (10 mg total) by mouth 3 (three) times daily as needed for Muscle spasms.    estradioL (ESTRACE) 1 MG tablet Take 1 oral tablet once per day    hydroCHLOROthiazide 12.5 MG Tab Take 1 tablet (12.5 mg total) by mouth once daily.    losartan (COZAAR) 25 MG tablet Take 1 tablet (25 mg total) by mouth once daily.    NP THYROID 30 mg Tab Take 1 tablet (30 mg total) by mouth once daily on empty stomach. Wait 30 minutes before consuming any food or beverage    NP THYROID 30 mg Tab Take 1 oral tablet once per day on empty stomach. Wait 30 minutes before consuming any food or beverage    potassium chloride (KLOR-CON) 10 MEQ TbSR Take 1 tablet (10 mEq total) by mouth once daily. for 180 doses    citric acid-potassium citrate (POLYCITRA) 1,100-334 mg/5 mL solution Take 5 mLs by mouth 3 (three) times daily with meals. (Patient not taking: Reported on 2/6/2025)    metFORMIN (GLUCOPHAGE) 500 MG tablet Take 1 oral tablet twice per day    promethazine (PHENERGAN) 12.5 MG Tab Take 1 tablet (12.5 mg total) by mouth every 12 (twelve) hours as needed (nausea).    valACYclovir (VALTREX) 500 MG tablet Take 1 tablet (500 mg total) by mouth 2 (two) times daily. (Patient taking differently: Take 500 mg by mouth as needed.)    zolpidem (AMBIEN) 10 mg Tab Take 1 tablet (10 mg total) by mouth every evening.       Review of patient's allergies indicates:  No Known Allergies     Past Medical History:   Diagnosis Date    Elevated ferritin 10/24/2024    Elevated vitamin B12 level 10/24/2024    Obesity     Other hyperlipidemia 07/03/2024     Past Surgical History:   Procedure Laterality Date    CHOLECYSTECTOMY       HYSTERECTOMY      TUBAL LIGATION       Family History   Problem Relation Name Age of Onset    Hypertension Mother      Hyperlipidemia Mother      Hypertension Brother      Hypertension Maternal Grandmother       Social History[1]      OBJECTIVE:     Vital Signs (Most Recent)       Physical Exam:                                                       GENERAL:  Comfortable, in no acute distress.                                 HEENT EXAM:  Nonicteric.  No adenopathy.  Oropharynx is clear.               NECK:  Supple.                                                               LUNGS:  Clear.                                                               CARDIAC:  Regular rate and rhythm.  S1, S2.  No murmur.                      ABDOMEN:  Soft, positive bowel sounds, nontender.  No hepatosplenomegaly or masses.  No rebound or guarding.                                             EXTREMITIES:  No edema.     MENTAL STATUS:  Normal, alert and oriented.      ASSESSMENT/PLAN:     Assessment: Screening    Plan: Colonoscopy    Anesthesia Plan: MAC    ASA Grade: ASA 2 - Patient with mild systemic disease with no functional limitations    MALLAMPATI SCORE:  I (soft palate, uvula, fauces, and tonsillar pillars visible)           [1]   Social History  Tobacco Use    Smoking status: Never    Smokeless tobacco: Never   Substance Use Topics    Alcohol use: Never    Drug use: Never

## 2025-04-07 NOTE — TRANSFER OF CARE
Anesthesia Transfer of Care Note    Patient: Fredo Chacon    Procedure(s) Performed: Procedure(s) (LRB):  COLONOSCOPY, SCREENING, HIGH RISK PATIENT (N/A)    Patient location: GI    Anesthesia Type: general    Transport from OR: Transported from OR on room air with adequate spontaneous ventilation    Post pain: adequate analgesia    Post assessment: no apparent anesthetic complications and tolerated procedure well    Post vital signs: stable    Level of consciousness: awake and alert    Nausea/Vomiting: no nausea/vomiting    Complications: none    Transfer of care protocol was followed      Last vitals: Visit Vitals  Breastfeeding No

## 2025-04-07 NOTE — PROVATION PATIENT INSTRUCTIONS
Discharge Summary/Instructions after an Endoscopic Procedure  Patient Name: Fredo Chacon  Patient MRN: [id number]  Patient YOB: 1970 Monday, April 7, 2025  Salomon Castañeda DO  Dear patient,  As a result of recent federal legislation (The Federal Cures Act), you may   receive lab or pathology results from your procedure in your [facility]   account before your physician is able to contact you. Your physician or   their representative will relay the results to you with their   recommendations at their soonest availability.  Thank you,  RESTRICTIONS:  During your procedure today, you received medications for sedation.  These   medications may affect your judgment, balance and coordination.  Therefore,   for 24 hours, you have the following restrictions:   - DO NOT drive a car, operate machinery, make legal/financial decisions,   sign important papers or drink alcohol.    ACTIVITY:  Today: no heavy lifting, straining or running due to procedural   sedation/anesthesia.  The following day: return to full activity including work.  DIET:  Eat and drink normally unless instructed otherwise.     TREATMENT FOR COMMON SIDE EFFECTS:  - Mild abdominal pain, nausea, belching, bloating or excessive gas:  rest,   eat lightly and use a heating pad.  - Sore Throat: treat with throat lozenges and/or gargle with warm salt   water.  - Because air was used during the procedure, expelling large amounts of air   from your rectum or belching is normal.  - If a bowel prep was taken, you may not have a bowel movement for 1-3 days.    This is normal.  SYMPTOMS TO WATCH FOR AND REPORT TO YOUR PHYSICIAN:  1. Abdominal pain or bloating, other than gas cramps.  2. Chest pain.  3. Back pain.  4. Signs of infection such as: chills or fever occurring within 24 hours   after the procedure.  5. Rectal bleeding, which would show as bright red, maroon, or black stools.   (A tablespoon of blood from the rectum is not serious, especially  if   hemorrhoids are present.)  6. Vomiting.  7. Weakness or dizziness.  GO DIRECTLY TO THE NEAREST EMERGENCY ROOM IF YOU HAVE ANY OF THE FOLLOWING:      Difficulty breathing              Chills and/or fever over 101 F   Persistent vomiting and/or vomiting blood   Severe abdominal pain   Severe chest pain   Black, tarry stools   Bleeding- more than one tablespoon   Any other symptom or condition that you feel may need urgent attention  Your doctor recommends these additional instructions:  If any biopsies were taken, your doctors clinic will contact you in 1 to 2   weeks with any results.  - Patient has a contact number available for emergencies.  The signs and   symptoms of potential delayed complications were discussed with the   patient.  Return to normal activities tomorrow.  Written discharge   instructions were provided to the patient.   - High fiber diet.   - Continue present medications.   - Repeat colonoscopy in 10 years for screening purposes.   - Return to GI clinic PRN.   - Discharge patient to home.  For questions, problems or results please call your physician - Salomon Castañeda DO at Work:  [phone].  [facility], EMERGENCY ROOM PHONE NUMBER: [phone]  IF A COMPLICATION OR EMERGENCY SITUATION ARISES AND YOU ARE UNABLE TO REACH   YOUR PHYSICIAN - GO DIRECTLY TO THE EMERGENCY ROOM.  Salomon Castañeda DO  4/7/2025 8:59:00 AM  This report has been verified and signed electronically.  Dear patient,  As a result of recent federal legislation (The Federal Cures Act), you may   receive lab or pathology results from your procedure in your [facility]   account before your physician is able to contact you. Your physician or   their representative will relay the results to you with their   recommendations at their soonest availability.  Thank you,  PROVATION

## 2025-04-08 VITALS
RESPIRATION RATE: 24 BRPM | SYSTOLIC BLOOD PRESSURE: 122 MMHG | OXYGEN SATURATION: 99 % | DIASTOLIC BLOOD PRESSURE: 80 MMHG | TEMPERATURE: 97 F | HEART RATE: 74 BPM

## 2025-04-17 ENCOUNTER — PATIENT MESSAGE (OUTPATIENT)
Dept: ADMINISTRATIVE | Facility: OTHER | Age: 55
End: 2025-04-17
Payer: COMMERCIAL

## 2025-04-17 ENCOUNTER — PATIENT MESSAGE (OUTPATIENT)
Dept: INTERNAL MEDICINE | Facility: CLINIC | Age: 55
End: 2025-04-17
Payer: COMMERCIAL

## 2025-04-17 DIAGNOSIS — E66.9 OBESITY, UNSPECIFIED CLASS, UNSPECIFIED OBESITY TYPE, UNSPECIFIED WHETHER SERIOUS COMORBIDITY PRESENT: ICD-10-CM

## 2025-04-17 RX ORDER — SEMAGLUTIDE 0.5 MG/.5ML
0.5 INJECTION, SOLUTION SUBCUTANEOUS
Qty: 2 ML | Refills: 0 | OUTPATIENT
Start: 2025-04-17

## 2025-04-23 ENCOUNTER — PATIENT MESSAGE (OUTPATIENT)
Dept: INTERNAL MEDICINE | Facility: CLINIC | Age: 55
End: 2025-04-23

## 2025-04-23 ENCOUNTER — OFFICE VISIT (OUTPATIENT)
Dept: INTERNAL MEDICINE | Facility: CLINIC | Age: 55
End: 2025-04-23
Payer: COMMERCIAL

## 2025-04-23 DIAGNOSIS — E66.812 OBESITY, CLASS II, BMI 35-39.9: Primary | ICD-10-CM

## 2025-04-23 PROCEDURE — 99499 UNLISTED E&M SERVICE: CPT | Mod: 95,,,

## 2025-04-23 RX ORDER — SEMAGLUTIDE 0.25 MG/.5ML
0.25 INJECTION, SOLUTION SUBCUTANEOUS
Qty: 2 ML | Refills: 0 | Status: ACTIVE | OUTPATIENT
Start: 2025-04-23

## 2025-04-23 RX ORDER — SEMAGLUTIDE 1 MG/.5ML
1 INJECTION, SOLUTION SUBCUTANEOUS
Qty: 2 ML | Refills: 0 | Status: ACTIVE | OUTPATIENT
Start: 2025-04-23

## 2025-04-23 RX ORDER — SEMAGLUTIDE 0.5 MG/.5ML
0.5 INJECTION, SOLUTION SUBCUTANEOUS
Qty: 2 ML | Refills: 0 | Status: ACTIVE | OUTPATIENT
Start: 2025-04-23

## 2025-04-23 NOTE — PROGRESS NOTES
Patient ID: Fredo Chacon is a 54 y.o. Black or  female    Subjective  Chief Complaint: patient presents for medical weight loss management.    Co-morbidities: HTN, DLD, prediabetes    HPI: Patient [facility] with [facility] in October [facility]24 and patient was closed out of the [facility] program in March 2025 due to having a colonoscopy and was unaware of what she had to do for holding therapy. Pt has been off therapy for about 2 months. Inaccurate documentation reflecting GI ADRs but pt denies any side effects and colonoscopy was a routine procedure.    Weight loss history:  Starting weight:    10/15/2024   Recent Readings    Weight (lbs) 213.8 lb    BMI 39.1 BMI    Current weight:    4/21/2025   Recent Readings    Weight (lbs) 197 lb    BMI 36.03 BMI    % weight loss since GLP-1 initiation: 7.9 %    Objective  Lab Results   Component Value Date     02/19/2025     10/28/2024     Lab Results   Component Value Date    K 3.5 02/19/2025    K 4.4 10/28/2024     Lab Results   Component Value Date     02/19/2025     10/28/2024     Lab Results   Component Value Date    CO2 30 (H) 02/19/2025    CO2 30 (H) 10/28/2024     Lab Results   Component Value Date    BUN 8 02/19/2025    BUN 17 10/28/2024     Lab Results   Component Value Date    GLU 85 02/19/2025     (H) 10/28/2024     Lab Results   Component Value Date    CALCIUM 8.9 02/19/2025    CALCIUM 10.4 10/28/2024     Lab Results   Component Value Date    PROT 6.2 02/19/2025    PROT 7.3 10/28/2024     Lab Results   Component Value Date    ALBUMIN 4.0 02/19/2025    ALBUMIN 4.3 10/28/2024     Lab Results   Component Value Date    BILITOT 0.4 02/19/2025    BILITOT 0.4 10/28/2024     Lab Results   Component Value Date    AST 22 02/19/2025    AST 15 10/28/2024     Lab Results   Component Value Date    ALT 29 02/19/2025    ALT 13 10/28/2024     Lab Results   Component Value Date    ANIONGAP 7 (L) 02/19/2025    ANIONGAP 7 (L)  10/28/2024     Lab Results   Component Value Date    CREATININE 0.7 02/19/2025    CREATININE 0.6 10/28/2024     Lab Results   Component Value Date    EGFRNORACEVR >60.0 02/19/2025    EGFRNORACEVR >60.0 10/28/2024     Assessment/Plan  - Restart Wegovy after 2 month gap in therapy  - Initiate Wegovy 0.25 mg once weekly for 1 month  - Then increase to Wegovy 0.5 mg once weekly for 1 month  - Then increase to Wegovy 1 mg once weekly  - RTC in 3 months for follow-up evaluation    Patient consented to pharmacist management via collaborative practice.

## 2025-04-29 DIAGNOSIS — I10 PRIMARY HYPERTENSION: ICD-10-CM

## 2025-04-29 RX ORDER — HYDROCHLOROTHIAZIDE 12.5 MG/1
12.5 TABLET ORAL DAILY
Qty: 90 TABLET | Refills: 1 | Status: SHIPPED | OUTPATIENT
Start: 2025-04-29 | End: 2025-10-26

## 2025-05-12 ENCOUNTER — LAB VISIT (OUTPATIENT)
Dept: LAB | Facility: HOSPITAL | Age: 55
End: 2025-05-12
Attending: NURSE PRACTITIONER
Payer: COMMERCIAL

## 2025-05-12 DIAGNOSIS — R79.89 ELEVATED FERRITIN: ICD-10-CM

## 2025-05-12 LAB
ABSOLUTE EOSINOPHIL (SMH): 0.08 K/UL
ABSOLUTE MONOCYTE (SMH): 0.45 K/UL (ref 0.3–1)
ABSOLUTE NEUTROPHIL COUNT (SMH): 2.6 K/UL (ref 1.8–7.7)
ALBUMIN SERPL-MCNC: 4.1 G/DL (ref 3.5–5.2)
ALP SERPL-CCNC: 54 UNIT/L (ref 55–135)
ALT SERPL-CCNC: 11 UNIT/L (ref 10–44)
ANION GAP (SMH): 6 MMOL/L (ref 8–16)
AST SERPL-CCNC: 15 UNIT/L (ref 10–40)
BASOPHILS # BLD AUTO: 0.03 K/UL
BASOPHILS NFR BLD AUTO: 0.6 %
BILIRUB SERPL-MCNC: 0.4 MG/DL (ref 0.1–1)
BUN SERPL-MCNC: 12 MG/DL (ref 6–20)
CALCIUM SERPL-MCNC: 9.3 MG/DL (ref 8.7–10.5)
CHLORIDE SERPL-SCNC: 104 MMOL/L (ref 95–110)
CO2 SERPL-SCNC: 31 MMOL/L (ref 23–29)
CREAT SERPL-MCNC: 0.7 MG/DL (ref 0.5–1.4)
ERYTHROCYTE [DISTWIDTH] IN BLOOD BY AUTOMATED COUNT: 13.2 % (ref 11.5–14.5)
GFR SERPLBLD CREATININE-BSD FMLA CKD-EPI: >60 ML/MIN/1.73/M2
GLUCOSE SERPL-MCNC: 82 MG/DL (ref 70–110)
HCT VFR BLD AUTO: 41 % (ref 37–48.5)
HGB BLD-MCNC: 13.1 GM/DL (ref 12–16)
IMM GRANULOCYTES # BLD AUTO: 0.01 K/UL (ref 0–0.04)
IMM GRANULOCYTES NFR BLD AUTO: 0.2 % (ref 0–0.5)
LYMPHOCYTES # BLD AUTO: 1.59 K/UL (ref 1–4.8)
MCH RBC QN AUTO: 29.2 PG (ref 27–31)
MCHC RBC AUTO-ENTMCNC: 32 G/DL (ref 32–36)
MCV RBC AUTO: 91 FL (ref 82–98)
NUCLEATED RBC (/100WBC) (SMH): 0 /100 WBC
PLATELET # BLD AUTO: 295 K/UL (ref 150–450)
PMV BLD AUTO: 9 FL (ref 9.2–12.9)
POTASSIUM SERPL-SCNC: 3.6 MMOL/L (ref 3.5–5.1)
PROT SERPL-MCNC: 6.5 GM/DL (ref 6–8.4)
RBC # BLD AUTO: 4.49 M/UL (ref 4–5.4)
RELATIVE EOSINOPHIL (SMH): 1.7 % (ref 0–8)
RELATIVE LYMPHOCYTE (SMH): 33.2 % (ref 18–48)
RELATIVE MONOCYTE (SMH): 9.4 % (ref 4–15)
RELATIVE NEUTROPHIL (SMH): 54.9 % (ref 38–73)
SODIUM SERPL-SCNC: 141 MMOL/L (ref 136–145)
WBC # BLD AUTO: 4.79 K/UL (ref 3.9–12.7)

## 2025-05-12 PROCEDURE — 36415 COLL VENOUS BLD VENIPUNCTURE: CPT

## 2025-05-12 PROCEDURE — 82247 BILIRUBIN TOTAL: CPT

## 2025-05-12 PROCEDURE — 85025 COMPLETE CBC W/AUTO DIFF WBC: CPT

## 2025-05-13 ENCOUNTER — OFFICE VISIT (OUTPATIENT)
Facility: CLINIC | Age: 55
End: 2025-05-13
Payer: COMMERCIAL

## 2025-05-13 VITALS
OXYGEN SATURATION: 100 % | WEIGHT: 197.63 LBS | DIASTOLIC BLOOD PRESSURE: 77 MMHG | SYSTOLIC BLOOD PRESSURE: 130 MMHG | HEIGHT: 62 IN | HEART RATE: 78 BPM | BODY MASS INDEX: 36.37 KG/M2 | TEMPERATURE: 98 F | RESPIRATION RATE: 18 BRPM

## 2025-05-13 DIAGNOSIS — D70.9 NEUTROPENIA, UNSPECIFIED TYPE: Primary | ICD-10-CM

## 2025-05-13 DIAGNOSIS — G47.09 OTHER INSOMNIA: ICD-10-CM

## 2025-05-13 DIAGNOSIS — R79.89 ELEVATED FERRITIN: ICD-10-CM

## 2025-05-13 DIAGNOSIS — E87.29 CO2 RETENTION: ICD-10-CM

## 2025-05-13 PROCEDURE — 3044F HG A1C LEVEL LT 7.0%: CPT | Mod: CPTII,S$GLB,, | Performed by: NURSE PRACTITIONER

## 2025-05-13 PROCEDURE — 3008F BODY MASS INDEX DOCD: CPT | Mod: CPTII,S$GLB,, | Performed by: NURSE PRACTITIONER

## 2025-05-13 PROCEDURE — 3078F DIAST BP <80 MM HG: CPT | Mod: CPTII,S$GLB,, | Performed by: NURSE PRACTITIONER

## 2025-05-13 PROCEDURE — 3075F SYST BP GE 130 - 139MM HG: CPT | Mod: CPTII,S$GLB,, | Performed by: NURSE PRACTITIONER

## 2025-05-13 PROCEDURE — 1159F MED LIST DOCD IN RCRD: CPT | Mod: CPTII,S$GLB,, | Performed by: NURSE PRACTITIONER

## 2025-05-13 PROCEDURE — 99214 OFFICE O/P EST MOD 30 MIN: CPT | Mod: S$GLB,,, | Performed by: NURSE PRACTITIONER

## 2025-05-13 PROCEDURE — 99999 PR PBB SHADOW E&M-EST. PATIENT-LVL V: CPT | Mod: PBBFAC,,, | Performed by: NURSE PRACTITIONER

## 2025-05-13 PROCEDURE — 4010F ACE/ARB THERAPY RXD/TAKEN: CPT | Mod: CPTII,S$GLB,, | Performed by: NURSE PRACTITIONER

## 2025-05-13 PROCEDURE — G2211 COMPLEX E/M VISIT ADD ON: HCPCS | Mod: S$GLB,,, | Performed by: NURSE PRACTITIONER

## 2025-05-13 NOTE — PROGRESS NOTES
[facility]  Follow Up   Subjective:      Patient ID:   NAME: Fredo Chacon : 1970     54 y.o. female    Referring Doc: Deisy Luong APRN*  Other Physicians: Beena Howard NP (GYN)        Chief Complaint: elevated ferritin/B12 - follow up       HPI:  54 y.o. female with diagnosis of elevated ferritin/B12 levels with neutropenia  who has been referred by Deisy Luong APRN* for evaluation by medical hematology/oncology. She is here by herself for a follow up visit.     She is doing well today no complaints.     She did have a repeat colonoscopy on , she will not have to repeat for 10 years.     She saw GYN about her menopause issues and had some basic labs done. She increased the patient's estradiol and started her on metformin and thyroid medication. She sees a midwife at [facility].     She finds that her energy is better.     She is also on Wegovy, but she has reached her goal weight.     No tobacco or alcohol    ROS:   GEN: normal without any fever, night sweats or weight loss; fatigue has improved   HEENT: normal with no HA's, sore throat, stiff neck, changes in vision  CV: normal with no CP, SOB, PND, STEVEN or orthopnea  PULM: normal with no SOB, cough, hemoptysis, sputum or pleuritic pain  GI: normal with no abdominal pain, nausea, vomiting, constipation, diarrhea, melanotic stools, BRBPR, or hematemesis  : normal with no hematuria, dysuria  BREAST: normal with no mass, discharge, pain  SKIN: normal with no rash, erythema, bruising, or swelling    Answers submitted by the patient for this visit:  Review of Systems Questionnaire (Submitted on 2025)  appetite change : No  unexpected weight change: No  mouth sores: No  visual disturbance: No  cough: No  shortness of breath: No  chest pain: No  abdominal pain: No  diarrhea: No  frequency: No  back pain: No  rash: No  headaches: No  adenopathy: No  nervous/ anxious: No         Past Medical/Surgical  History:  Past Medical History:   Diagnosis Date    Elevated ferritin 10/24/2024    Elevated vitamin B12 level 10/24/2024    Obesity     Other hyperlipidemia 07/03/2024     Past Surgical History:   Procedure Laterality Date    CHOLECYSTECTOMY      COLONOSCOPY, SCREENING, HIGH RISK PATIENT N/A 4/7/2025    Procedure: COLONOSCOPY, SCREENING, HIGH RISK PATIENT;  Surgeon: Salomon Castañeda DO;  Location: [facility];  Service: Endoscopy;  Laterality: N/A;    HYSTERECTOMY      TUBAL LIGATION           Allergies:  Review of patient's allergies indicates:  No Known Allergies    Social/Family History:  Social History     Socioeconomic History    Marital status: Single   Tobacco Use    Smoking status: Never    Smokeless tobacco: Never   Substance and Sexual Activity    Alcohol use: Never    Drug use: Never     Social Drivers of Health     Financial Resource Strain: Low Risk  (2/11/2025)    Overall Financial Resource Strain (CARDIA)     Difficulty of Paying Living Expenses: Not hard at all   Food Insecurity: No Food Insecurity (2/11/2025)    Hunger Vital Sign     Worried About Running Out of Food in the Last Year: Never true     Ran Out of Food in the Last Year: Never true   Transportation Needs: No Transportation Needs (2/11/2025)    PRAPARE - Transportation     Lack of Transportation (Medical): No     Lack of Transportation (Non-Medical): No   Physical Activity: Sufficiently Active (2/11/2025)    Exercise Vital Sign     Days of Exercise per Week: 5 days     Minutes of Exercise per Session: 30 min   Stress: No Stress Concern Present (2/11/2025)    [employer] - Occupational Stress Questionnaire     Feeling of Stress : Only a little   Housing Stability: Low Risk  (2/11/2025)    Housing Stability Vital Sign     Unable to Pay for Housing in the Last Year: No     Number of Times Moved in the Last Year: 0     Homeless in the Last Year: No     Family History   Problem Relation Name Age of Onset     Hypertension Mother      Hyperlipidemia Mother      Hypertension Brother      Hypertension Maternal Grandmother           Medications:    Current Outpatient Medications:     atorvastatin (LIPITOR) 10 MG tablet, Take 1 tablet (10 mg total) by mouth every evening., Disp: 90 tablet, Rfl: 3    cyclobenzaprine (FLEXERIL) 10 MG tablet, Take 1 tablet (10 mg total) by mouth 3 (three) times daily as needed for Muscle spasms., Disp: 60 tablet, Rfl: 1    estradioL (ESTRACE) 1 MG tablet, Take 1 oral tablet once per day, Disp: 90 tablet, Rfl: 2    hydroCHLOROthiazide 12.5 MG Tab, Take 1 tablet (12.5 mg total) by mouth once daily., Disp: 90 tablet, Rfl: 1    losartan (COZAAR) 25 MG tablet, Take 1 tablet (25 mg total) by mouth once daily., Disp: 90 tablet, Rfl: 1    metFORMIN (GLUCOPHAGE) 500 MG tablet, Take 1 oral tablet twice per day, Disp: 180 tablet, Rfl: 4    NP THYROID 30 mg Tab, Take 1 tablet (30 mg total) by mouth once daily on empty stomach. Wait 30 minutes before consuming any food or beverage, Disp: 30 tablet, Rfl: 0    NP THYROID 30 mg Tab, Take 1 oral tablet once per day on empty stomach. Wait 30 minutes before consuming any food or beverage, Disp: 90 tablet, Rfl: 4    potassium chloride (KLOR-CON) 10 MEQ TbSR, Take 1 tablet (10 mEq total) by mouth once daily. for 180 doses, Disp: 90 tablet, Rfl: 1    promethazine (PHENERGAN) 12.5 MG Tab, Take 1 tablet (12.5 mg total) by mouth every 12 (twelve) hours as needed (nausea)., Disp: 20 tablet, Rfl: 1    semaglutide, weight loss, (WEGOVY) 0.25 mg/0.5 mL PnIj, Inject 0.25 mg into the skin every 7 days., Disp: 2 mL, Rfl: 0    semaglutide, weight loss, (WEGOVY) 0.5 mg/0.5 mL PnIj, Inject 0.5 mg into the skin every 7 days., Disp: 2 mL, Rfl: 0    semaglutide, weight loss, (WEGOVY) 1 mg/0.5 mL PnIj, Inject 1 mg into the skin every 7 days., Disp: 2 mL, Rfl: 0    valACYclovir (VALTREX) 500 MG tablet, Take 1 tablet (500 mg total) by mouth 2 (two) times daily., Disp: 180 tablet, Rfl:  "1    zolpidem (AMBIEN) 10 mg Tab, Take 1 tablet (10 mg total) by mouth every evening., Disp: 30 tablet, Rfl: 2      Pathology:   Cancer Staging   No matching staging information was found for the patient.        Objective:   Vitals:  Blood pressure 130/77, pulse 78, temperature 97.8 °F (36.6 °C), temperature source Temporal, resp. rate 18, height 5' 2" (1.575 m), weight 89.6 kg (197 lb 9.6 oz), SpO2 100%.    Physical Examination:   GEN: no apparent distress, comfortable; AAOx3;   HEAD: atraumatic and normocephalic  EYES: no pallor, no icterus, PERRLA  ENT: OMM, no pharyngeal erythema, external ears WNL; no nasal discharge; no thrush  NECK: no masses, thyroid normal, trachea midline, no LAD/LN's, supple  CV: RRR with no murmur; normal pulse; normal S1 and S2; no pedal edema  CHEST: Normal respiratory effort; CTAB; normal breath sounds; no wheeze or crackles  ABDOM: nontender and nondistended; soft; normal bowel sounds; no rebound/guarding  MUSC/Skeletal: ROM normal; no crepitus; joints normal; no deformities or arthropathy  EXTREM: no clubbing, cyanosis, inflammation or swelling  SKIN: no rashes, lesions, ulcers, petechiae or subcutaneous nodules  : no orr  NEURO: grossly intact; motor/sensory WNL; AAOx3; no tremors  PSYCH: normal mood, affect and behavior  LYMPH: normal cervical, supraclavicular, axillary and groin LN's      Labs:     Lab Results   Component Value Date    WBC 4.79 05/12/2025    HGB 13.1 05/12/2025    HCT 41.0 05/12/2025    MCV 91 05/12/2025     05/12/2025       CMP  Sodium   Date Value Ref Range Status   05/12/2025 141 136 - 145 mmol/L Final     Potassium   Date Value Ref Range Status   05/12/2025 3.6 3.5 - 5.1 mmol/L Final     Chloride   Date Value Ref Range Status   05/12/2025 104 95 - 110 mmol/L Final     CO2   Date Value Ref Range Status   05/12/2025 31 (H) 23 - 29 mmol/L Final     Glucose   Date Value Ref Range Status   05/12/2025 82 70 - 110 mg/dL Final     BUN   Date Value Ref " Range Status   05/12/2025 12 6 - 20 mg/dL Final     Creatinine   Date Value Ref Range Status   05/12/2025 0.7 0.5 - 1.4 mg/dL Final     Calcium   Date Value Ref Range Status   05/12/2025 9.3 8.7 - 10.5 mg/dL Final     Protein Total   Date Value Ref Range Status   05/12/2025 6.5 6.0 - 8.4 gm/dL Final     Albumin   Date Value Ref Range Status   05/12/2025 4.1 3.5 - 5.2 g/dL Final     Bilirubin Total   Date Value Ref Range Status   05/12/2025 0.4 0.1 - 1.0 mg/dL Final     Comment:     For infants and newborns, interpretation of results should be based   on gestational age, weight and in agreement with clinical   observations.    Premature Infant recommended reference ranges:   0-24 hours:  <8.0 mg/dL   24-48 hours: <12.0 mg/dL   3-5 days:    <15.0 mg/dL   6-29 days:   <15.0 mg/dL     ALP   Date Value Ref Range Status   05/12/2025 54 (L) 55 - 135 unit/L Final     AST   Date Value Ref Range Status   05/12/2025 15 10 - 40 unit/L Final     ALT   Date Value Ref Range Status   05/12/2025 11 10 - 44 unit/L Final     Anion Gap   Date Value Ref Range Status   05/12/2025 6 (L) 8 - 16 mmol/L Final     eGFR   Date Value Ref Range Status   05/12/2025 >60 >60 mL/min/1.73/m2 Final   02/19/2025 >60.0 >60 mL/min/1.73 m^2 Final       Radiology/Diagnostic Studies:    FINDINGS:  There are scattered areas of fibroglandular density.   There are no suspicious masses, microcalcifications, or architectural distortion.     Impression:     Negative mammogram. Annual screening mammography is recommended.     BI-RADS CATEGORY 1: NEGATIVE.     Tyrer-Cuzick lifetime risk calculated at 6.54 %.     Women with a greater than 20% lifetime risk for breast cancer may benefit from supplemental screening including annual breast magnetic resonance imaging (MRI), in addition to annual mammography.        Electronically signed by:Luis Eduardo Trevino  Date:                                            10/04/2024  Time:                                            08:55      All lab results and imaging results have been reviewed and discussed with the patient    Assessment:   (1) 54 y.o. female with diagnosis of elevated ferritin/B12 levels who has been referred by Deisy Luong APRN* for evaluation by medical hematology/oncology.     - ferritin was 201 and only mildly elevated - could be an acute phase reactant due to inflammation, arthritis, etc; will check complete iron panel and order hemochromatosis gene study out of abundance of precaution  - B12 level was 1353 and only mildly elevated - B12 is a water soluble vitamin that is obtained from the foods we eat; excess is urinated out  - CBC is adequate and WNL  - she had colonoscopy about 5 yrs ago and was good except for internal hemorrhoids     5/13/2025:   - cbc, cmp WNL   - colonscopy repeat was normal   - repeat labs in 6 months     (2) HTN and hypercholesterolemia    (3) Prediabetic    (4) Obesity - on phentermine     (5) Hx/of HSV infection    (6) Insomnia     (7) Hx/of hysterectomy (2009)    (8) Hx/of cholecystectomy ('85)      VISIT DIAGNOSES:     Neutropenia, unspecified type  -     CBC Auto Differential; Standing  -     CMP; Standing    Other insomnia  -     Ambulatory referral/consult to Sleep Disorders; Future; Expected date: 05/20/2025    CO2 retention    Elevated ferritin          Plan:     PLAN:  Cbc, cmp WNL, will recheck in 6 months   Hemocromotosis panel negative and reviewed patient   F/u with GYN and PCP  Colonscopy reviewed with patient, no bleeding, repeat in 10 years   CO 2 elevated and patient having insomina, will need sleep study eval     RTC 6 months with Dr. Wood       I have explained and the patient understands all of  the current recommendation(s). I have answered all of their questions to the best of my ability and to their complete satisfaction.           Thank you for allowing me to participate in this patient's care. Please call with any questions or concerns.    Electronically  signed Daya St. Marycruz Wagoner NP    Answers submitted by the patient for this visit:  Review of Systems Questionnaire (Submitted on 10/19/2024)  appetite change : No  unexpected weight change: No  mouth sores: No  visual disturbance: No  cough: No  shortness of breath: No  chest pain: No  abdominal pain: No  diarrhea: No  frequency: No  back pain: No  rash: No  headaches: No  adenopathy: No  nervous/ anxious: No

## 2025-05-16 ENCOUNTER — PATIENT MESSAGE (OUTPATIENT)
Dept: ADMINISTRATIVE | Facility: OTHER | Age: 55
End: 2025-05-16
Payer: COMMERCIAL

## 2025-05-28 ENCOUNTER — CLINICAL SUPPORT (OUTPATIENT)
Dept: OTHER | Facility: CLINIC | Age: 55
End: 2025-05-28

## 2025-05-28 DIAGNOSIS — Z00.8 ENCOUNTER FOR OTHER GENERAL EXAMINATION: ICD-10-CM

## 2025-05-29 VITALS
DIASTOLIC BLOOD PRESSURE: 78 MMHG | BODY MASS INDEX: 36.25 KG/M2 | SYSTOLIC BLOOD PRESSURE: 126 MMHG | WEIGHT: 192 LBS | HEIGHT: 61 IN

## 2025-05-29 LAB
HDLC SERPL-MCNC: 71 MG/DL
POC CHOLESTEROL, LDL (DOCK): 122 MG/DL
POC CHOLESTEROL, TOTAL: 203 MG/DL
POC GLUCOSE, FASTING: 79 MG/DL (ref 60–110)
TRIGL SERPL-MCNC: 55 MG/DL

## 2025-06-04 DIAGNOSIS — I10 PRIMARY HYPERTENSION: ICD-10-CM

## 2025-06-04 DIAGNOSIS — R25.2 LEG CRAMPING: ICD-10-CM

## 2025-06-04 RX ORDER — CYCLOBENZAPRINE HCL 10 MG
10 TABLET ORAL 3 TIMES DAILY PRN
Qty: 60 TABLET | Refills: 1 | Status: SHIPPED | OUTPATIENT
Start: 2025-06-04

## 2025-06-04 RX ORDER — POTASSIUM CHLORIDE 750 MG/1
10 TABLET, EXTENDED RELEASE ORAL DAILY
Qty: 90 TABLET | Refills: 1 | Status: SHIPPED | OUTPATIENT
Start: 2025-06-04 | End: 2025-12-01

## 2025-06-11 DIAGNOSIS — G47.00 INSOMNIA, UNSPECIFIED TYPE: ICD-10-CM

## 2025-06-11 RX ORDER — ZOLPIDEM TARTRATE 10 MG/1
10 TABLET ORAL NIGHTLY
Qty: 30 TABLET | Refills: 2 | Status: SHIPPED | OUTPATIENT
Start: 2025-06-11

## 2025-06-11 NOTE — TELEPHONE ENCOUNTER
Pt is needing a refill on her zolpidem. Last office visit 02/06/2025. Next office visit 08/06/2025.

## 2025-06-15 ENCOUNTER — PATIENT MESSAGE (OUTPATIENT)
Dept: ADMINISTRATIVE | Facility: OTHER | Age: 55
End: 2025-06-15
Payer: COMMERCIAL

## 2025-07-10 DIAGNOSIS — E66.812 OBESITY, CLASS II, BMI 35-39.9: ICD-10-CM

## 2025-07-10 RX ORDER — SEMAGLUTIDE 1 MG/.5ML
1 INJECTION, SOLUTION SUBCUTANEOUS
Qty: 2 ML | Refills: 0 | OUTPATIENT
Start: 2025-07-10

## 2025-07-17 NOTE — PROGRESS NOTES
Patient ID: Fredo Chacon is a 54 y.o. Black or  female    Subjective  Chief Complaint: patient presents for medical weight loss management.    Co-morbidities: HTN, DLD, prediabetes    HPI: Patient started [facility] with [facility] in October 2024 and patient was closed out of the [facility] program in March 2025 due to having a colonoscopy and was unaware of what she had to do for holding therapy. Pt has been off therapy for about 2 months. Inaccurate documentation reflecting GI ADRs but pt denies any side effects and colonoscopy was a routine procedure. Patient restarted Wegovy with [facility] in April 2024 and is currently managed on Wegovy 1 mg.    Tolerance to current therapy:  Denies nausea, vomiting, diarrhea, constipation, abdominal pain    Weight loss history:  Starting weight:    10/15/2024   Recent Readings    Weight (lbs) 213.8 lb    BMI 39.1 BMI    Current weight:    7/21/2025   Recent Readings    Weight (lbs) 186 lb    BMI 34.02 BMI    % weight loss since GLP-1 initiation: 12.5 %    Objective  Lab Results   Component Value Date     05/12/2025     02/19/2025     10/28/2024     Lab Results   Component Value Date    K 3.6 05/12/2025    K 3.5 02/19/2025    K 4.4 10/28/2024     Lab Results   Component Value Date     05/12/2025     02/19/2025     10/28/2024     Lab Results   Component Value Date    CO2 31 (H) 05/12/2025    CO2 30 (H) 02/19/2025    CO2 30 (H) 10/28/2024     Lab Results   Component Value Date    BUN 12 05/12/2025    BUN 8 02/19/2025    BUN 17 10/28/2024     Lab Results   Component Value Date    GLU 82 05/12/2025    GLU 85 02/19/2025     (H) 10/28/2024     Lab Results   Component Value Date    CALCIUM 9.3 05/12/2025    CALCIUM 8.9 02/19/2025    CALCIUM 10.4 10/28/2024     Lab Results   Component Value Date    PROT 6.5 05/12/2025    PROT 6.2 02/19/2025    PROT 7.3 10/28/2024     Lab Results   Component Value Date     ALBUMIN 4.1 05/12/2025    ALBUMIN 4.0 02/19/2025    ALBUMIN 4.3 10/28/2024     Lab Results   Component Value Date    BILITOT 0.4 05/12/2025    BILITOT 0.4 02/19/2025    BILITOT 0.4 10/28/2024     Lab Results   Component Value Date    AST 15 05/12/2025    AST 22 02/19/2025    AST 15 10/28/2024     Lab Results   Component Value Date    ALT 11 05/12/2025    ALT 29 02/19/2025    ALT 13 10/28/2024     Lab Results   Component Value Date    ANIONGAP 6 (L) 05/12/2025    ANIONGAP 7 (L) 02/19/2025    ANIONGAP 7 (L) 10/28/2024     Lab Results   Component Value Date    CREATININE 0.7 05/12/2025    CREATININE 0.7 02/19/2025    CREATININE 0.6 10/28/2024     Lab Results   Component Value Date    EGFRNORACEVR >60 05/12/2025    EGFRNORACEVR >60.0 02/19/2025    EGFRNORACEVR >60.0 10/28/2024     Assessment/Plan  - Increase to Wegovy 1.7 mg SQ weekly x 4 weeks once completed with Wegovy 1 mg  - Then increase to Wegovy 2.4 mg SQ weekly  - RTC in 3 months for follow-up evaluation      Patient consented to pharmacist management via collaborative practice.

## 2025-07-21 ENCOUNTER — PATIENT MESSAGE (OUTPATIENT)
Dept: INTERNAL MEDICINE | Facility: CLINIC | Age: 55
End: 2025-07-21

## 2025-07-21 ENCOUNTER — OFFICE VISIT (OUTPATIENT)
Dept: INTERNAL MEDICINE | Facility: CLINIC | Age: 55
End: 2025-07-21
Payer: COMMERCIAL

## 2025-07-21 DIAGNOSIS — E66.811 OBESITY, CLASS I, BMI 30.0-34.9 (SEE ACTUAL BMI): Primary | ICD-10-CM

## 2025-07-21 PROCEDURE — 99499 UNLISTED E&M SERVICE: CPT | Mod: 95,,,

## 2025-07-21 RX ORDER — SEMAGLUTIDE 2.4 MG/.75ML
2.4 INJECTION, SOLUTION SUBCUTANEOUS
Qty: 3 ML | Refills: 1 | Status: ACTIVE | OUTPATIENT
Start: 2025-07-21

## 2025-07-21 RX ORDER — SEMAGLUTIDE 1.7 MG/.75ML
1.7 INJECTION, SOLUTION SUBCUTANEOUS
Qty: 3 ML | Refills: 0 | Status: ACTIVE | OUTPATIENT
Start: 2025-07-21

## 2025-07-22 ENCOUNTER — TELEPHONE (OUTPATIENT)
Dept: FAMILY MEDICINE | Facility: CLINIC | Age: 55
End: 2025-07-22
Payer: COMMERCIAL

## 2025-07-25 ENCOUNTER — TELEPHONE (OUTPATIENT)
Dept: FAMILY MEDICINE | Facility: CLINIC | Age: 55
End: 2025-07-25
Payer: COMMERCIAL

## 2025-07-25 NOTE — TELEPHONE ENCOUNTER
----- Message from Yolande sent at 7/25/2025 10:30 AM CDT -----  Regarding: LAB ORDERS ? 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

## 2025-08-06 ENCOUNTER — OFFICE VISIT (OUTPATIENT)
Dept: FAMILY MEDICINE | Facility: CLINIC | Age: 55
End: 2025-08-06
Payer: COMMERCIAL

## 2025-08-06 ENCOUNTER — TELEPHONE (OUTPATIENT)
Dept: FAMILY MEDICINE | Facility: CLINIC | Age: 55
End: 2025-08-06

## 2025-08-06 VITALS
HEIGHT: 61 IN | WEIGHT: 190.63 LBS | SYSTOLIC BLOOD PRESSURE: 132 MMHG | BODY MASS INDEX: 35.99 KG/M2 | HEART RATE: 74 BPM | DIASTOLIC BLOOD PRESSURE: 72 MMHG | OXYGEN SATURATION: 98 %

## 2025-08-06 DIAGNOSIS — E66.01 SEVERE OBESITY (BMI 35.0-39.9) WITH COMORBIDITY: ICD-10-CM

## 2025-08-06 DIAGNOSIS — Z13.6 ENCOUNTER FOR LIPID SCREENING FOR CARDIOVASCULAR DISEASE: ICD-10-CM

## 2025-08-06 DIAGNOSIS — Z13.220 ENCOUNTER FOR LIPID SCREENING FOR CARDIOVASCULAR DISEASE: ICD-10-CM

## 2025-08-06 DIAGNOSIS — Z00.00 WELLNESS EXAMINATION: Primary | ICD-10-CM

## 2025-08-06 DIAGNOSIS — I10 PRIMARY HYPERTENSION: ICD-10-CM

## 2025-08-06 DIAGNOSIS — Z12.31 ENCOUNTER FOR SCREENING MAMMOGRAM FOR BREAST CANCER: ICD-10-CM

## 2025-08-06 DIAGNOSIS — E07.9 THYROID DYSFUNCTION: ICD-10-CM

## 2025-08-06 DIAGNOSIS — R11.0 NAUSEA: ICD-10-CM

## 2025-08-06 PROCEDURE — 99396 PREV VISIT EST AGE 40-64: CPT | Mod: S$GLB,,,

## 2025-08-06 PROCEDURE — 3044F HG A1C LEVEL LT 7.0%: CPT | Mod: CPTII,S$GLB,,

## 2025-08-06 PROCEDURE — 1160F RVW MEDS BY RX/DR IN RCRD: CPT | Mod: CPTII,S$GLB,,

## 2025-08-06 PROCEDURE — 3008F BODY MASS INDEX DOCD: CPT | Mod: CPTII,S$GLB,,

## 2025-08-06 PROCEDURE — 3075F SYST BP GE 130 - 139MM HG: CPT | Mod: CPTII,S$GLB,,

## 2025-08-06 PROCEDURE — 4010F ACE/ARB THERAPY RXD/TAKEN: CPT | Mod: CPTII,S$GLB,,

## 2025-08-06 PROCEDURE — 3078F DIAST BP <80 MM HG: CPT | Mod: CPTII,S$GLB,,

## 2025-08-06 PROCEDURE — 1159F MED LIST DOCD IN RCRD: CPT | Mod: CPTII,S$GLB,,

## 2025-08-06 RX ORDER — PROMETHAZINE HYDROCHLORIDE 12.5 MG/1
12.5 TABLET ORAL EVERY 12 HOURS PRN
Qty: 20 TABLET | Refills: 1 | Status: SHIPPED | OUTPATIENT
Start: 2025-08-06

## 2025-08-06 RX ORDER — HYDROCHLOROTHIAZIDE 12.5 MG/1
12.5 TABLET ORAL DAILY
Qty: 90 TABLET | Refills: 1 | Status: SHIPPED | OUTPATIENT
Start: 2025-08-06 | End: 2026-02-02

## 2025-08-06 RX ORDER — LOSARTAN POTASSIUM 25 MG/1
25 TABLET ORAL DAILY
Qty: 90 TABLET | Refills: 1 | Status: SHIPPED | OUTPATIENT
Start: 2025-08-06 | End: 2026-02-02

## 2025-08-06 NOTE — TELEPHONE ENCOUNTER
Pt notified this lab is not available in epic. Pt aware. Set to [facility] and no pt has had an issue with this prior. pt aware to call back if any issues getting labs done. To you to resign

## 2025-08-06 NOTE — TELEPHONE ENCOUNTER
----- Message from Agnes sent at 8/6/2025  2:08 PM CDT -----  The patient just saw Deisy. She would like her lab orders to be sent to the [facility]. She works there. She said the order has to say [facility] PT'S # [phone] GH

## 2025-08-07 ENCOUNTER — LAB VISIT (OUTPATIENT)
Dept: LAB | Facility: HOSPITAL | Age: 55
End: 2025-08-07
Payer: COMMERCIAL

## 2025-08-07 DIAGNOSIS — Z13.6 ENCOUNTER FOR LIPID SCREENING FOR CARDIOVASCULAR DISEASE: ICD-10-CM

## 2025-08-07 DIAGNOSIS — Z00.00 WELLNESS EXAMINATION: ICD-10-CM

## 2025-08-07 DIAGNOSIS — Z13.220 ENCOUNTER FOR LIPID SCREENING FOR CARDIOVASCULAR DISEASE: ICD-10-CM

## 2025-08-07 LAB
BILIRUB UR QL STRIP.AUTO: NEGATIVE
CHOLEST SERPL-MCNC: 188 MG/DL (ref 120–199)
CHOLEST/HDLC SERPL: 2.5 {RATIO} (ref 2–5)
CLARITY UR: ABNORMAL
COLOR UR AUTO: YELLOW
GLUCOSE UR QL STRIP: NEGATIVE
HDLC SERPL-MCNC: 75 MG/DL (ref 40–75)
HDLC SERPL: 39.9 % (ref 20–50)
HGB UR QL STRIP: NEGATIVE
KETONES UR QL STRIP: NEGATIVE
LDLC SERPL CALC-MCNC: 104.8 MG/DL (ref 63–159)
LEUKOCYTE ESTERASE UR QL STRIP: NEGATIVE
NITRITE UR QL STRIP: NEGATIVE
NONHDLC SERPL-MCNC: 113 MG/DL
PH UR STRIP: 7 [PH]
PROT UR QL STRIP: NEGATIVE
SP GR UR STRIP: 1.01
T4 FREE SERPL-MCNC: 0.76 NG/DL (ref 0.71–1.51)
TRIGL SERPL-MCNC: 41 MG/DL (ref 30–150)
TSH SERPL-ACNC: 1.01 UIU/ML (ref 0.34–5.6)
UROBILINOGEN UR STRIP-ACNC: NEGATIVE EU/DL

## 2025-08-07 PROCEDURE — 36415 COLL VENOUS BLD VENIPUNCTURE: CPT

## 2025-08-07 PROCEDURE — 84439 ASSAY OF FREE THYROXINE: CPT

## 2025-08-07 PROCEDURE — 81003 URINALYSIS AUTO W/O SCOPE: CPT

## 2025-08-07 PROCEDURE — 84443 ASSAY THYROID STIM HORMONE: CPT

## 2025-08-07 PROCEDURE — 80061 LIPID PANEL: CPT

## 2025-08-13 ENCOUNTER — PATIENT MESSAGE (OUTPATIENT)
Dept: ADMINISTRATIVE | Facility: OTHER | Age: 55
End: 2025-08-13
Payer: COMMERCIAL

## 2025-08-24 PROBLEM — E66.01 SEVERE OBESITY (BMI 35.0-39.9) WITH COMORBIDITY: Status: ACTIVE | Noted: 2024-07-03
